# Patient Record
Sex: FEMALE | Race: OTHER | Employment: UNEMPLOYED | URBAN - METROPOLITAN AREA
[De-identification: names, ages, dates, MRNs, and addresses within clinical notes are randomized per-mention and may not be internally consistent; named-entity substitution may affect disease eponyms.]

---

## 2017-02-28 ENCOUNTER — CLINICAL SUPPORT (OUTPATIENT)
Dept: FAMILY MEDICINE CLINIC | Age: 32
End: 2017-02-28

## 2017-02-28 DIAGNOSIS — Z13.9 SCREENING: Primary | ICD-10-CM

## 2017-02-28 LAB
HCG URINE, QL. (POC): POSITIVE
VALID INTERNAL CONTROL?: YES

## 2017-02-28 NOTE — PROGRESS NOTES
Mary Wynn UPT in house today. Confirmation of Pregnancy document completed, signed and given to patient. [facility] provided and information therein reviewed regarding prenatal vitamins, WIC and options for prenatal care. Prenatal appointment scheduled for 4/4/2017 at [address]. Mil Zarate at [address] [facility], [address] [place] [address]. Location/contact information provided to patient. Explained Care Card/financial assistance process and emphasized need to inquire about this at outpatient registration. Advised that APA will also attempt to establish contact either in the hospital or via telephone and further emphasized the necessity of this additional financial screening. Reviewed reasons to go to ED. Communicated via , Gabrielle Benitez. Expressed understanding of above stated items and had no further questions. Stepan Albert RN

## 2017-04-04 ENCOUNTER — OFFICE VISIT (OUTPATIENT)
Dept: FAMILY MEDICINE CLINIC | Age: 32
End: 2017-04-04

## 2017-04-04 VITALS
DIASTOLIC BLOOD PRESSURE: 62 MMHG | HEART RATE: 78 BPM | HEIGHT: 59 IN | WEIGHT: 164 LBS | BODY MASS INDEX: 33.06 KG/M2 | OXYGEN SATURATION: 100 % | TEMPERATURE: 98 F | SYSTOLIC BLOOD PRESSURE: 109 MMHG | RESPIRATION RATE: 16 BRPM

## 2017-04-04 DIAGNOSIS — Z32.01 POSITIVE PREGNANCY TEST: Primary | ICD-10-CM

## 2017-04-04 LAB
HCG URINE, QL. (POC): POSITIVE
VALID INTERNAL CONTROL?: YES

## 2017-04-04 NOTE — PROGRESS NOTES
Subjective:   Enma Urrutia is a 28 y.o. female,  who is being seen today for possible pregnancy due to missed menses. LMP unknown. She is sexually active with partner and is not using contraception. Allergies- reviewed:   No Known Allergies      Medications- reviewed:   No current outpatient prescriptions on file. No current facility-administered medications for this visit. Past Medical History- reviewed:  History reviewed. No pertinent past medical history. Past Surgical History- reviewed:   No past surgical history on file. Social History- reviewed:  Social History     Social History    Marital status:      Spouse name: N/A    Number of children: N/A    Years of education: N/A     Occupational History    Not on file.      Social History Main Topics    Smoking status: Never Smoker    Smokeless tobacco: Not on file    Alcohol use No    Drug use: Not on file    Sexual activity: Not on file     Other Topics Concern    Not on file     Social History Narrative    No narrative on file         ROS:  General: No fevers or chills  GI: No abdominal pain, nausea, vomiting  : No vaginal bleeding      Objective:     Visit Vitals    /62    Pulse 78    Temp 98 °F (36.7 °C) (Oral)    Resp 16    Ht 4' 11.06\" (1.5 m)    Wt 164 lb (74.4 kg)    SpO2 100%    BMI 33.06 kg/m2       Physical Exam:  GENERAL APPEARANCE: alert, well appearing, in no apparent distress  HEAD: normocephalic, atraumatic  LUNGS: clear to auscultation, no wheezes, rales or rhonchi, symmetric air entry  HEART: regular rate and rhythm, no murmurs  ABDOMEN: FHT unable to appreciate  BACK: no CVA tenderness    Labs:  Urine pregnancy test positive    Recent Results (from the past 12 hour(s))   AMB POC URINE PREGNANCY TEST, VISUAL COLOR COMPARISON    Collection Time: 17  8:52 AM   Result Value Ref Range    VALID INTERNAL CONTROL POC Yes     HCG urine, Ql. (POC) Positive Negative         Assessment:       ICD-10-CM ICD-9-CM    1. Positive pregnancy test Z32.01 V72.42 AMB POC URINE PREGNANCY TEST, VISUAL COLOR COMPARISON       Pregnancy at unknown as LPM unknown. GALA: unknown. Plan:   · Return to clinic in 4 week(s) for initial prenatal visit  · Patient scheduled for dating ultrasound  · Prenatal vitamins      Orders Placed This Encounter    AMB POC URINE PREGNANCY TEST, VISUAL COLOR COMPARISON         I have discussed the diagnosis with the patient and the intended plan as seen in the above orders. The patient has received an after-visit summary and questions were answered concerning future plans. I have discussed medication side effects and warnings with the patient as well. Informed pt to return to the office or go to the ER if she experiences vaginal bleeding, vaginal discharge, leaking of fluid, pelvic cramping.       Alexander Berry MD  Family Medicine Resident

## 2017-04-04 NOTE — MR AVS SNAPSHOT
Visit Information Cam Olson Personal Médico Departamento Teléfono del [facility]. Número de visita 4/4/2017  8:45 AM Leigha Russell MD [facility] [phone] [id number] Follow-up Instructions Return in about 4 weeks (around 5/2/2017). Upcoming Health Maintenance Date Due DTaP/Tdap/Td series (1 - Tdap) 11/20/2007 PAP AKA CERVICAL CYTOLOGY 11/20/2007 INFLUENZA AGE 9 TO ADULT 8/1/2016 Alergias  Review Complete El: 4/4/2017 Por: Abdi Mcmullen LPN A partir del:  4/4/2017 No Known Allergies Vacunas actuales Marci Christians No hay ninguna vacuna archivada. No revisadas esta visita You Were Diagnosed With   
  
 Sofi Croft Positive pregnancy test    -  Primary ICD-10-CM: Z32.01 
ICD-9-CM: V72.42 Partes vitales PS Pulso Temperatura Resp Bentley ( percentil de crecimiento) Peso (percentil de crecimiento) 109/62 78 98 °F (36.7 °C) (Oral) 16 4' 11.06\" (1.5 m) 164 lb (74.4 kg) SpO2 BMI ([facility]) Estado obstétrico Estatus de tabaquísmo 100% 33.06 kg/m2 Unknown Never Smoker Historial de signos vitales BMI and BSA Data Body Mass Index Body Surface Area 33.06 kg/m 2 1.76 m 2 Charles lista de medicamentos actualizada Aviso  As of 4/4/2017  9:02 AM  
 No se le ha recetado ningún medicamento. Hicimos lo siguiente AMB POC URINE PREGNANCY TEST, VISUAL COLOR COMPARISON [65715 CPT(R)] Instrucciones de seguimiento Return in about 4 weeks (around 5/2/2017). Instrucciones para el Paciente Semanas 6 a 10 de charles embarazo: Instrucciones de cuidado - [ Eugune Jesenia 6 to 10 of Your Pregnancy: Care Instructions ] Instrucciones de cuidado Felicitaciones por charles embarazo. Carmen momento es muy emocionante e importante para usted.  
Joel primeras 6 a 10 semanas de [place], [place] cuerpo [facility] por muchos cambios. Lan bebé crece muy rápido, a pesar de que usted no pueda sentirlo aún. Safiasa Yu a notar que se siente distinta, tanto física tod emocionalmente. Dado que el embarazo de cada david es [place], no existe harsh manera correcta de sentirse. Podría sentirse más saludable que nunca o podría sentirse cansada o tener molestias estomacales (\"náuseas matutinas\"). Estas primeras semanas son un tiempo para bud decisiones saludables y comer los mejores alimentos para usted y lan bebé. Esta hoja de cuidados le dará [employer]. Carmen es también un buen momento para pensar acerca de las pruebas para detectar defectos congénitos. Estas son las pruebas que se hacen zay el embarazo para buscar posibles problemas con el bebé. Las pruebas del primer trimestre para detectar defectos de nacimiento se pueden hacer entre las semanas 10 y 15 del embarazo, dependiendo de la prueba. Hable con lan médico acerca de qué tipos de pruebas existen. La atención de seguimiento es harsh parte clave de lan tratamiento y seguridad. Asegúrese de hacer y acudir a todas las citas, y llame a lan médico si está teniendo problemas. También es harsh buena idea saber los resultados de mike exámenes y mantener harsh lista de los medicamentos que robin. Cómo puede cuidarse en el hogar? Maeola Pore · Coma al menos 3 comidas y 2 refrigerios saludables todos los días. Coma alimentos frescos y enteros, incluyendo: ¨ 7 o más porciones de pan, tortillas, cereales, arroz, pastas o alex. ¨ 3 o más porciones de verduras, en especial las de hojas verdes. ¨ 2 o más porciones de frutas. ¨ 3 o más porciones de [place], [place] o [place]-barre. ¨ 2 o más porciones de carne, [place], trice, pescado, huevos o frijoles (habichuelas) secos. · Joyce abundantes líquidos, sobre todo agua. Evite beber sodas y otras bebidas endulzadas.  
· Elija alimentos que contengan vitaminas que son importantes para lan bebé, tod calcio, dago y ácido fólico. 
 ¨ Los lácteos, el tofu, el pescado enlatado con espinas, las [place], el brócoli, las verduras de hojas verdes, las tortillas de maíz y el jugo de naranja fortificado son buenas stone de calcio. ¨ La carne, las aves, el hígado, la [place], las [place], los frijoles secos, los cereales fortificados y las frutas secas son ricos en dago. ¨ Las verduras de hojas oscuras, el brócoli, los [place], el hígado, los cereales fortificados, el jugo de [place], los cacahuates [place]) y las almendras son buenas stone de ácido fólico. 
· Evite comer alimentos que podrían hacerle daño al bebé. ¨ No coma carne, trice o pescado crudos o semicocidos (tod sushi u ostras crudas). ¨ No coma huevos crudos ni alimentos que contengan huevos crudos, tod el aderezo para [place]. ¨ No coma quesos blandos ni lácteos sin pasteurizar, tod queso \"brie\", feta o queso carissa. ¨ No consuma pescados que [place] de angelica, tod tiburón, pez debra, blanquillo o caballa gigante. No coma más de 6 onzas (170 g) de atún por semana. ¨ No coma brotes crudos, especialmente de alfalfa. ¨ Reduzca las bebidas que contengan cafeína, tod el café, el té y las torres. Protéjase y proteja a lan bebé · No toque las kassandra de excrementos del [place]. Pueden causarle harsh infección que podría hacerle daño al bebé. · La temperatura corporal kyle puede ser perjudicial para lan bebé. Así es que si Hermon Lingo usar un sauna o harsh bañera de hidromasaje, asegúrese de hablar con lan médico acerca de cómo usarlos con seguridad. Cómo lidiar con las náuseas matutinas · Joyce pequeños sorbos de [place], jugos o batidos. Pruebe a beber [employer], no con las comidas. · Coma 5 o 6 comidas pequeñas al día. Pruebe con pan zenia seco o galletas apenas se levante, y desayune un poco más tarde. · Evite los alimentos picantes, aceitosos o grasosos.  
· Cuando sienta malestar, dominguez las ventanas o salga a caminar para bud aire fresco. 
 · [facility]. Suelen ayudar a [facility] [employer]. · Informe a lan médico si usted piensa que las vitaminas prenatales le están haciendo mal. 

Dónde puede encontrar más información en inglés? Jose A Peoples a [email or web address] Escriba G112 en la búsqueda para aprender más acerca de \"Semanas 6 a 10 de lan embarazo: Instrucciones de cuidado - [ Mennie Oneal 6 to 10 of Your Pregnancy: Care Instructions ]. \" 
Revisado: 30 philippe, 2016 Versión del contenido: 11.2 © 7540-3362 Healthwise, Incorporated. Las instrucciones de cuidado fueron adaptadas bajo licencia por [employer] (which disclaims liability or warranty for this information). Si usted tiene [place] afección médica o sobre estas instrucciones, siempre pregunte a lan profesional de emiliano. Healthwise, Incorporated niega toda garantía o responsabilidad por lan uso de esta información. Introducing [facility]! [facility] introduce portal paciente [employer] [place] . Ahora se puede acceder a partes de lan expediente médico, enviar por correo electrónico la oficina de lan médico y solicitar renovaciones de medicamentos en línea. En lan navegador de Internet , Janeth Wayne a [email or web address] Jose clic en el usuario por Dawson Soulier? Tay Lev clic aquí en la sesión Murphy Jim. Verá la página de registro [place]. Ingrese lan código de [employer] Jennifer tram y tod aparece a continuación. Usted no tendrá que [employer] código después de clive completado el proceso de registro . Si usted no se inscribe antes de la fecha de caducidad , debe solicitar un nuevo código. · MyChart Código de acceso : [id number] Expires: 7/3/2017  8:42 AM 
 
Ingresa los últimos cuatro dígitos de lan Número de Seguro Social ( xxxx ) y fecha de nacimiento ( dd / mm / aaaa ) tod se indica y jose clic en Enviar. Usted será llevado a la siguiente página de registro . Crear un ID MyChart . Esta será lan ID de inicio de sesión de MyChart y no puede ser [place] , por lo que pensar en harsh que es Lillia Peeks y fácil de recordar . Crear harsh contraseña MyChart . Usted puede cambiar lan contraseña en cualquier momento . Ingrese lan Password Reset de preguntas y [place] . [place] se puede utilizar en un momento posterior si usted olvida lan contraseña. Introduzca lan dirección de correo electrónico . Dorenda Opmarisol recibirá harsh notificación por correo electrónico cuando la nueva información está disponible en MyChart . Thresea Kenny clic en Registrarse. Bristolarlin Mcdonald jodi y descargar porciones de lan expediente médico. 
Thu clic en el enlace de descarga del menú Resumen para descargar harsh copia portátil de lan información médica . Si tiene [employer] , por favor visite la sección de preguntas frecuentes del sitio web MyChart . Recuerde, MyChart NO es que se utilizará para las necesidades urgentes. Para emergencias médicas , llame al 911 . Ahora disponible en lan iPhone y Android ! Por favor proporcione blue resumen de la documentación de cuidado a lan próximo proveedor. If you have any questions after today's visit, please call [phone].

## 2017-04-04 NOTE — PATIENT INSTRUCTIONS
Semanas 6 a 10 de lan embarazo: Instrucciones de cuidado - [ Sallie Melena 6 to 10 of Your Pregnancy: Care Instructions ]  Instrucciones de cuidado    Felicitaciones por lan embarazo. Carmen momento es muy emocionante e importante para usted. [place] primeras 6 a 10 semanas de [place], lan cuerpo [place] por muchos cambios. Lan bebé crece muy rápido, a pesar de que usted no pueda sentirlo aún. Valeriy Lux a notar que se siente distinta, tanto física tod emocionalmente. Dado que el embarazo de cada david es [place], no existe harsh manera correcta de sentirse. Podría sentirse más saludable que nunca o podría sentirse cansada o tener molestias estomacales (\"náuseas matutinas\"). Estas primeras semanas son un tiempo para bud decisiones saludables y comer los mejores alimentos para usted y lan bebé. Esta hoja de cuidados le dará [employer]. Carmen es también un buen momento para pensar acerca de las pruebas para detectar defectos congénitos. Estas son las pruebas que se hacen zay el embarazo para buscar posibles problemas con el bebé. Las pruebas del primer trimestre para detectar defectos de nacimiento se pueden hacer entre las semanas 10 y 15 del embarazo, dependiendo de la prueba. Hable con lan médico acerca de qué tipos de pruebas existen. La atención de seguimiento es harsh parte clave de lan tratamiento y seguridad. Asegúrese de hacer y acudir a todas las citas, y llame a lan médico si está teniendo problemas. También es harsh buena idea saber los resultados de mike exámenes y mantener harsh lista de los medicamentos que robin. ¿Cómo puede cuidarse en el hogar? Aliméntese john  · Coma al menos 3 comidas y 2 refrigerios saludables todos los días. Coma alimentos frescos y enteros, incluyendo:  ¨ 7 o más porciones de pan, tortillas, cereales, arroz, pastas o alex. ¨ 3 o más porciones de verduras, en especial las de hojas verdes. ¨ 2 o más porciones de frutas. ¨ 3 o más porciones de [place], [place] o [place]-barre.   ¨ 2 o más porciones de carne, pavo, trice, pescado, huevos o frijoles (habichuelas) secos. · Joyce abundantes líquidos, sobre todo agua. Evite beber sodas y otras bebidas endulzadas. · Elija alimentos que contengan vitaminas que son importantes para lan bebé, tod calcio, dago y ácido fólico.  ¨ Los lácteos, el tofu, el pescado enlatado con espinas, las [place], el brócoli, las verduras de hojas verdes, las tortillas de maíz y el jugo de naranja fortificado son buenas stone de calcio. ¨ La carne, las aves, el hígado, la [place], las [place], los frijoles secos, los cereales fortificados y las frutas secas son ricos en dago. ¨ Las verduras de hojas oscuras, el brócoli, los [place], el hígado, los cereales fortificados, el jugo de [place], los cacahuates [place]) y las almendras son buenas stone de ácido fólico.  · Evite comer alimentos que podrían hacerle daño al bebé. ¨ No coma carne, trice o pescado crudos o semicocidos (tod sushi u ostras crudas). ¨ No coma huevos crudos ni alimentos que contengan huevos crudos, tod el aderezo para [place]. ¨ No coma quesos blandos ni lácteos sin pasteurizar, tod queso \"brie\", feta o queso carissa. ¨ No consuma pescados que [place] de angelica, tod tiburón, pez debra, blanquillo o caballa gigante. No coma más de 6 onzas (170 g) de atún por semana. ¨ No coma brotes crudos, especialmente de alfalfa. ¨ Reduzca las bebidas que contengan cafeína, tod el café, el té y las torres. Protéjase y proteja a lan bebé  · No toque las kassandra de excrementos del [place]. Pueden causarle harsh infección que podría hacerle daño al bebé. · La temperatura corporal kyle puede ser perjudicial para lan bebé. Así es que si N[place] Lot usar un sauna o harsh bañera de hidromasaje, asegúrese de hablar con lan médico acerca de cómo usarlos con seguridad. Cómo lidiar con las náuseas matutinas  · [place] pequeños sorbos de [facility], [facility] haven o batidos.  Pruebe a beber [employer], no con las comidas. · Coma 5 o 6 comidas pequeñas al día. Pruebe con pan zenia seco o galletas apenas se levante, y desayune un poco más tarde. · Evite los alimentos picantes, aceitosos o grasosos. · Cuando sienta malestar, dominguez las ventanas o salga a caminar para bud aire fresco.  · Pruebe las pulseras antináuseas. Suelen ayudar a [employer]. · Informe a lan médico si usted piensa que las vitaminas prenatales le están haciendo mal.  ¿Dónde puede encontrar más información en inglés? Nilesh [place] a [email or web address]/. Escriba G112 en la búsqueda para aprender más acerca de \"Semanas 6 a 10 de lan embarazo: Instrucciones de cuidado - [ Theodore Lugo 6 to 10 of Your Pregnancy: Care Instructions ]. \"  Revisado: 30 [place], 2016  Versión del contenido: 11.2  © 5901-0955 Healthwise, Incorporated. Las instrucciones de cuidado fueron adaptadas bajo licencia por [employer] Help Connections (which disclaims liability or warranty for this information). Si usted tiene [place] afección médica o sobre estas instrucciones, siempre pregunte a lan profesional de emiliano. Healthwise, Incorporated niega toda garantía o responsabilidad por lan uso de esta información.

## 2017-04-18 ENCOUNTER — HOSPITAL ENCOUNTER (OUTPATIENT)
Dept: LAB | Age: 32
Discharge: HOME | End: 2017-04-18
Payer: SUBSIDIZED

## 2017-04-18 ENCOUNTER — INITIAL PRENATAL (OUTPATIENT)
Dept: FAMILY MEDICINE CLINIC | Age: 32
End: 2017-04-18

## 2017-04-18 VITALS
OXYGEN SATURATION: 98 % | DIASTOLIC BLOOD PRESSURE: 61 MMHG | SYSTOLIC BLOOD PRESSURE: 104 MMHG | WEIGHT: 164.2 LBS | RESPIRATION RATE: 16 BRPM | HEIGHT: 59 IN | BODY MASS INDEX: 33.1 KG/M2 | TEMPERATURE: 99.2 F | HEART RATE: 78 BPM

## 2017-04-18 DIAGNOSIS — Z34.91 FIRST TRIMESTER PREGNANCY: Primary | ICD-10-CM

## 2017-04-18 DIAGNOSIS — N39.0 URINARY TRACT INFECTION WITHOUT HEMATURIA, SITE UNSPECIFIED: ICD-10-CM

## 2017-04-18 LAB
BILIRUB UR QL STRIP: NEGATIVE
GLUCOSE UR-MCNC: NEGATIVE MG/DL
KETONES P FAST UR STRIP-MCNC: NEGATIVE MG/DL
PH UR STRIP: 8.5 [PH] (ref 4.6–8)
PROT UR QL STRIP: ABNORMAL MG/DL
SP GR UR STRIP: 1.01 (ref 1–1.03)
UA UROBILINOGEN AMB POC: ABNORMAL (ref 0.2–1)
URINALYSIS CLARITY POC: CLEAR
URINALYSIS COLOR POC: YELLOW
URINE BLOOD POC: NEGATIVE
URINE LEUKOCYTES POC: ABNORMAL
URINE NITRITES POC: NEGATIVE

## 2017-04-18 PROCEDURE — 88175 CYTOPATH C/V AUTO FLUID REDO: CPT | Performed by: FAMILY MEDICINE

## 2017-04-18 PROCEDURE — 87624 HPV HI-RISK TYP POOLED RSLT: CPT | Performed by: FAMILY MEDICINE

## 2017-04-18 NOTE — PATIENT INSTRUCTIONS
Andrew Ni a lan médico zay el embarazo (hasta las 20 semanas) - [ Learning About When to Call Your Doctor During Pregnancy (Up to 20 Weeks) ]  Instrucciones de cuidado  Es normal que tenga inquietudes acerca de lo que podría ser un problema zay el [facility]. Aunque la mayoría de las mujeres embarazadas no tienen ningún problema grave, es importante saber cuándo llamar a lan médico si tiene determinados síntomas. Estas son algunas sugerencias generales. Lan médico puede darle más información sobre cuándo llamar. Cuándo llamar a lan médico (hasta las 20 semanas)  Llame al 911 en cualquier momento que sospeche que puede necesitar atención de [place]. Por ejemplo, llame si:  · Se desmayó (perdió el conocimiento). Llame a lan médico ahora mismo o busque atención médica inmediata si:  · Tiene fiebre. · Tiene sangrado vaginal.  · Está mareada o aturdida, o siente que puede desmayarse. · Tiene síntomas de harsh infección del tracto urinario. Estos pueden incluir:  ¨ Dolor o ardor al orinar. ¨ Necesidad de orinar con frecuencia sin poder eliminar mucha orina. ¨ Dolor en el flanco, que se encuentra lebron debajo de la caja torácica y [place] de la cintura en un lado de la espalda. ¨ Maxime en la orina. · Tiene dolor abdominal.  · Paola que está teniendo contracciones. · Tiene harsh pérdida repentina de líquido por la vagina. Preste especial atención a los cambios en lan emiliano y asegúrese de comunicarse con lan médico si:  · Tiene flujo vaginal con un olor desagradable. · Tiene otras inquietudes acerca de lan embarazo. La atención de seguimiento es harsh parte clave de lan tratamiento y seguridad. Asegúrese de hacer y acudir a todas las citas, y llame a lan médico si está teniendo problemas. También es harsh buena idea saber los resultados de mike exámenes y mantener harsh lista de los medicamentos que robin. ¿Dónde puede encontrar más información en inglés?   Jose A [place] a [email or web address]/. Jesse More W410 en la búsqueda para aprender más acerca de \"Aprenda cuándo llamar a lan médico zay el embarazo ([address] 20 semanas) - [ Learning About When to Call Your Doctor During Pregnancy (Up to 20 Weeks) ]. \"  Revisado: 30 philippe, 2016  Versión del contenido: 11.2  © 8684-9049 Healthwise, Incorporated. Las instrucciones de cuidado fueron adaptadas bajo licencia por [employer] Connections (which disclaims liability or warranty for this information). Si usted tiene Cornelius [place] afección médica o sobre estas instrucciones, siempre pregunte a lan profesional de emiliano. Healthwise, Incorporated niega toda garantía o responsabilidad por lan uso de esta información.

## 2017-04-18 NOTE — MR AVS SNAPSHOT
Visit Information Meg Melgar Personal Médico Departamento Teléfono del Dep. Número de visita 4/18/2017  9:50 AM Leigha Albert, 1000 [facility] [phone] Follow-up Instructions Return in about 4 weeks (around 5/16/2017). Your Appointments 4/25/2017  1:00 PM  
PROCEDURE with Corey Veronica MD  
1000 [facility] 3651 [facility]) Appt Note: dating us Juli Madre Arcelia Lynne Dougherty 906 1007 [facility]  
[phone]  
  
   
 Juli Madre Arcelia Lynne Dougherty 906 [address] [address] Upcoming Health Maintenance Date Due DTaP/Tdap/Td series (1 - Tdap) 11/20/2007 PAP AKA CERVICAL CYTOLOGY 11/20/2007 INFLUENZA AGE 9 TO ADULT 8/1/2016 Alergias  Review Complete El: 4/18/2017 Por: Rashi Arteaga LPN A partir del:  4/18/2017 No Known Allergies Vacunas actuales Miko Radar No hay ninguna vacuna archivada. No revisadas esta visita You Were Diagnosed With   
  
 [place] First trimester pregnancy    -  Primary ICD-10-CM: Z33.1 ICD-9-CM: V22.2 Partes vitales PS Pulso Temperatura Resp [place] ( percentil de crecimiento) Peso (percentil de crecimiento) 104/61 (BP 1 Location: Left arm, BP Patient Position: Sitting) 78 99.2 °F (37.3 °C) (Oral) 16 4' 11.06\" (1.5 m) 164 lb 3.2 oz (74.5 kg) SpO2 BMI (IMC) Estado obstétrico Estatus de tabaquísmo 98% 33.1 kg/m2 Pregnant Never Smoker Historial de signos vitales BMI and BSA Data Body Mass Index Body Surface Area  
 33.1 kg/m 2 1.76 m 2 [facility] Name Phone [facility] 0651, [address] [facility] [address] [place] [address] Charles lista de medicamentos actualizada Lista actualizada el: 4/18/17 10:17 AM.  Agueda Kurk use charles lista de medicamentos más reciente. PRENATAL VITAMIN PO Take  by mouth. Hicimos lo siguiente ABO GROUPING AND RHO(D) TYPING [id number] CPT(R)] AMB POC URINALYSIS DIP STICK AUTO W/O MICRO [37991 CPT(R)] ANTIBODY SCREEN [id number] CPT(R)] CBC W/O DIFF [10792 CPT(R)] Metro Du / Randeen Askew [id number] CPT(R)] CULTURE, URINE [id number] CPT(R)] HIV 1/2 AG/AB, 4TH GENERATION,W RFLX CONFIRM [[id number] Custom] PAP IG, APTIMA HPV AND RFX 16/18,45 (892552) [[id number] Custom] RUBELLA AB, IGG [id number] CPT(R)] T PALLIDUM AB [[id number] Custom] Instrucciones de seguimiento Return in about 4 weeks (around 5/16/2017). Por hacer 04/18/2017 Lab:  HEP B SURFACE AG   
  
 04/18/2017 Lab:  VZV AB, IGG Instrucciones para el Paciente Aliyah Brett a lan médico zay el embarazo ([address] [place] [address] 20 semanas) - [ Learning About When to Call Your Doctor During Pregnancy (Up to 20 Weeks) ] Instrucciones de cuidado Es normal que tenga inquietudes acerca de lo que podría ser un problema zay el [facility]. Aunque la mayoría de las mujeres embarazadas no tienen ningún problema grave, es importante saber cuándo llamar a lan médico si tiene determinados síntomas. Estas son algunas sugerencias generales. Lan médico puede darle más información sobre cuándo llamar. Cuándo llamar a lan médico ([address]) Llame al 911 en cualquier momento que sospeche que puede necesitar atención de [place]. Por ejemplo, llame si: 
· Se desmayó (perdió el conocimiento). Llame a lan médico ahora mismo o busque atención médica inmediata si: · Tiene fiebre. · Tiene sangrado vaginal. 
· Está mareada o aturdida, o siente que puede desmayarse. · Tiene síntomas de harsh infección del tracto urinario. Estos pueden incluir: ¨ Dolor o ardor al orinar. ¨ Necesidad de orinar con frecuencia sin poder eliminar mucha orina. ¨ Dolor en el flanco, que se encuentra lebron debajo de la caja torácica y [place] de la cintura en un lado de la espalda. ¨ Maxime en la orina. · Tiene dolor abdominal. 
· Paola que está teniendo contracciones. · Tiene harsh pérdida repentina de líquido por la vagina. Preste especial atención a los cambios en lan emiliano y asegúrese de comunicarse con lan médico si: · Tiene flujo vaginal con un olor desagradable. · Tiene otras inquietudes acerca de lan embarazo. La atención de seguimiento es harsh parte clave de lan tratamiento y seguridad. Asegúrese de hacer y acudir a todas las citas, y llame a lan médico si está teniendo problemas. También es harsh buena idea saber los resultados de mike exámenes y mantener harsh lista de los medicamentos que robin. Dónde puede encontrar más información en inglés? Norma Deter a [email or web address]://brittani-winston.info/. Abhay Puff H922 en la búsqueda para aprender más acerca de \"Aprenda cuándo llamar a lan médico zay el embarazo ([address] [facility] 20 semanas) - [ Learning About When to Call Your Doctor During Pregnancy (Up to 20 Weeks) ]. \" 
Revisado: 30 philippe, 2016 Versión del contenido: 11.2 © 9201-1711 Healthwise, Incorporated. Las instrucciones de cuidado fueron adaptadas bajo licencia por Good Help Connections (which disclaims liability or warranty for this information). Si usted tiene [place] afección médica o sobre estas instrucciones, siempre pregunte a lan profesional de emiliano. Healthwise, Incorporated niega toda garantía o responsabilidad por lan uso de esta información. Introducing [facility]! [facility] introduce portal paciente MyChart . Ahora se puede acceder a partes de lan expediente médico, enviar por correo electrónico la oficina de lan médico y solicitar renovaciones de medicamentos en línea. En lan navegador de Internet , Elinda Stack a https://[email or web address]. [email or web address]. [email or web address]/[email or web address] Thu clic en el usuario por Elonda Atilio? Graciella Rang clic aquí en la sesión Diana Saenz. Verá la página de registro [place]. Ingrese lan código de [facility] tram y tod aparece a continuación. Usted no tendrá que [employer] código después de clive completado el proceso de registro . Si usted no se inscribe antes de la fecha de caducidad , debe solicitar un nuevo código. · MyChart Código de acceso : [id number] Expires: 7/3/2017  8:42 AM 
 
Ingresa los últimos cuatro dígitos de lan Número de Seguro Social ( xxxx ) y fecha de nacimiento ( dd / mm / aaaa ) tod se indica y thu clic en Enviar. Usted será llevado a la siguiente página de registro . Crear un ID MyChart . Esta será lan ID de inicio de sesión de MyChart y no puede ser [place] , por lo que pensar en harsh que es [place] Fruit y fácil de recordar . Crear harsh contraseña MyChart . Usted puede cambiar lan contraseña en cualquier momento . Ingrese lan Password Reset de preguntas y [place] . [place] se puede utilizar en un momento posterior si usted olvida lan contraseña. Introduzca lan dirección de correo electrónico . Evelyn Oddi recibirá harsh notificación por correo electrónico cuando la nueva información está disponible en MyChart . Yariel Ortiz clic en Registrarse. Stevie Bueno jodi y descargar porciones de lan expediente médico. 
Thu clic en el enlace de descarga del menú Resumen para descargar harsh copia portátil de lan información médica . Si tiene [employer] , por favor visite la sección de preguntas frecuentes del sitio web MyChart . Recuerde, MyChart NO es que se utilizará para las necesidades urgentes. Para emergencias médicas , llame al 911 . Ahora disponible en lan iPhone y Android ! Por favor proporcione blue resumen de la documentación de cuidado a lan próximo proveedor. If you have any questions after today's visit, please call [phone].

## 2017-04-18 NOTE — PROGRESS NOTES
Vidimax  # [id number]    Ruby Dolan is a 28 y.o. female  Chief Complaint   Patient presents with    Initial Prenatal Visit     1. Have you been to the ER, urgent care clinic since your last visit? Hospitalized since your last visit? No    2. Have you seen or consulted any other health care providers outside of the [address] [place] [address] since your last visit? Include any pap smears or colon screening.  No

## 2017-04-18 NOTE — PROGRESS NOTES
Subjective:   Anisha Robertson is a 28 y.o. female  who is being seen today for her first obstetrical visit. Patient's last menstrual period is unknown because her periods are very irregular. She is at unknown gestation. Her obstetrical history is significant for Pre-eclampsia. Pregnancy history fully reviewed. History reviewed. No pertinent past medical history. Current Outpatient Prescriptions   Medication Sig Dispense Refill    PRENATAL VIT CALC,IRON,FOLIC (PRENATAL VITAMIN PO) Take  by mouth. No Known Allergies  Family History   Problem Relation Age of Onset    GERD Mother     Parkinsonism Father      Social History     Social History    Marital status:      Spouse name: N/A    Number of children: N/A    Years of education: N/A     Occupational History    Not on file. Social History Main Topics    Smoking status: Never Smoker    Smokeless tobacco: Not on file    Alcohol use No    Drug use: Not on file    Sexual activity: Not on file     Other Topics Concern    Not on file     Social History Narrative       Objective:     Visit Vitals     4' 11.06\" (1.5 m)    Wt 164 lb 3.2 oz (74.5 kg)    BMI 33.1 kg/m2       Physical Exam:  GENERAL APPEARANCE: alert, well appearing, in no apparent distress  HEAD: normocephalic, atraumatic  BREASTS: no skin changes  LUNGS: clear to auscultation, no wheezes, rales or rhonchi, symmetric air entry  HEART: regular rate and rhythm, no murmurs  ABDOMEN: FHT present 135  BACK: no CVA tenderness  EXTERNAL GENITALIA: normal appearing vulva with no masses, tenderness or lesions  VAGINA: thick, yellowish discharge  CERVIX: no lesions   UTERUS: gravid  EXTREMITIES: no redness or tenderness in the calves or thighs, no edema  NEUROLOGICAL: alert, oriented, normal speech, no focal findings or movement disorder noted  SKIN: normal coloration and turgor, no rashes    Assessment:     Pregnancy at unknown GA.  Pregnancy complicated by history of Pre-E in previous pregnancy. Plan:   -Initial labs/specimens collected (CBC, blood type & screen, Rh antibody screen, rubella titer, varicella titer, HBsAg titer, RPR, HIV, UA w/ cx, pap smear, gonorrhea/chlamydia cx.  -Prenatal vitamins.  -Problem list reviewed and updated. - AFP3 (Quad screen) discussed: will try to obtain once dates are established. - Anatomy scan at 20 weeks. Form faxed will be faxed to [facility]. - [address] given to patient. - Will provide prenatal passport at next visit, as patient did not receive it today. - Patient will return genetic screening form at next visit. - Dating ultrasound on 4/25  -Follow-up in 4 weeks. Appointment on 5/16. I have discussed the diagnosis with the patient and the intended plan as seen in the above orders. The patient has received an after-visit summary and questions were answered concerning future plans. I have discussed medication side effects and warnings with the patient as well. Patient discussed with Dr. Cheryl Eason.     Raf Gupta MD  Family Medicine Resident

## 2017-04-20 LAB
ABO GROUP BLD DONR: NORMAL
ANTIBODY SCREEN, EXTERNAL: NEGATIVE
BACTERIA UR CULT: ABNORMAL
BLD GP AB SCN SERPL QL: NEGATIVE
C TRACH RRNA SPEC QL NAA+PROBE: NEGATIVE
CHLAMYDIA, EXTERNAL: NEGATIVE
ERYTHROCYTE [DISTWIDTH] IN BLOOD BY AUTOMATED COUNT: 13.6 % (ref 12.3–15.4)
HBSAG, EXTERNAL: NEGATIVE
HBV SURFACE AG SERPL QL IA: NEGATIVE
HCT VFR BLD AUTO: 37.8 % (ref 34–46.6)
HGB BLD-MCNC: 12.8 G/DL (ref 11.1–15.9)
HIV 1+2 AB+HIV1 P24 AG SERPL QL IA: NON REACTIVE
HIV, EXTERNAL: NEGATIVE
MCH RBC QN AUTO: 30.3 PG (ref 26.6–33)
MCHC RBC AUTO-ENTMCNC: 33.9 G/DL (ref 31.5–35.7)
MCV RBC AUTO: 89 FL (ref 79–97)
N GONORRHOEA RRNA SPEC QL NAA+PROBE: NEGATIVE
N. GONORRHEA, EXTERNAL: NEGATIVE
PLATELET # BLD AUTO: 269 X10E3/UL (ref 150–379)
RBC # BLD AUTO: 4.23 X10E6/UL (ref 3.77–5.28)
RH BLD: POSITIVE
RPR, EXTERNAL: NON REACTIVE
RUBELLA, EXTERNAL: NORMAL
RUBV IGG SERPL IA-ACNC: 5.04 INDEX
T PALLIDUM AB SER QL IA: NEGATIVE
TYPE, ABO & RH, EXTERNAL: NORMAL
VZV IGG SER IA-ACNC: 521 INDEX
WBC # BLD AUTO: 10 X10E3/UL (ref 3.4–10.8)

## 2017-04-21 ENCOUNTER — TELEPHONE (OUTPATIENT)
Dept: FAMILY MEDICINE CLINIC | Age: 32
End: 2017-04-21

## 2017-04-21 RX ORDER — CEPHALEXIN 500 MG/1
500 CAPSULE ORAL 2 TIMES DAILY
Qty: 14 CAP | Refills: 0 | Status: SHIPPED | OUTPATIENT
Start: 2017-04-21 | End: 2017-04-28

## 2017-04-24 ENCOUNTER — TELEPHONE (OUTPATIENT)
Dept: FAMILY MEDICINE CLINIC | Age: 32
End: 2017-04-24

## 2017-04-24 NOTE — TELEPHONE ENCOUNTER
----- Message from Waylon Caldwell sent at 4/24/2017 12:13 PM EDT -----  Regarding: Dr. Lawrence Bernardo Telephone  Patient needs to reschedule procedure appt for 04/25/17. Please call her back at [phone].   Patient speaks Setswana

## 2017-05-16 ENCOUNTER — ROUTINE PRENATAL (OUTPATIENT)
Dept: FAMILY MEDICINE CLINIC | Age: 32
End: 2017-05-16

## 2017-05-16 VITALS
HEART RATE: 76 BPM | TEMPERATURE: 97.8 F | OXYGEN SATURATION: 99 % | BODY MASS INDEX: 33.26 KG/M2 | DIASTOLIC BLOOD PRESSURE: 76 MMHG | RESPIRATION RATE: 18 BRPM | WEIGHT: 165 LBS | HEIGHT: 59 IN | SYSTOLIC BLOOD PRESSURE: 109 MMHG

## 2017-05-16 DIAGNOSIS — Z34.00 PRENATAL CARE, FIRST PREGNANCY, UNSPECIFIED TRIMESTER: Primary | ICD-10-CM

## 2017-05-16 LAB
BILIRUB UR QL STRIP: NEGATIVE
GLUCOSE UR-MCNC: NEGATIVE MG/DL
KETONES P FAST UR STRIP-MCNC: NEGATIVE MG/DL
PH UR STRIP: 7.5 [PH] (ref 4.6–8)
PROT UR QL STRIP: NEGATIVE MG/DL
SP GR UR STRIP: 1.01 (ref 1–1.03)
UA UROBILINOGEN AMB POC: NORMAL (ref 0.2–1)
URINALYSIS CLARITY POC: CLEAR
URINALYSIS COLOR POC: YELLOW
URINE BLOOD POC: NEGATIVE
URINE LEUKOCYTES POC: NORMAL
URINE NITRITES POC: NEGATIVE

## 2017-05-16 NOTE — PATIENT INSTRUCTIONS
Semanas 18 a 22 de lan embarazo: Instrucciones de cuidado - [ Dane Spotted 18 to 22 of Your Pregnancy: Care Instructions ]  Instrucciones de cuidado    Lan bebé continúa desarrollándose rápidamente. En esta etapa, los bebés ya pueden chuparse el pulgar, agarrar firmemente con las [place], y abrir y cerrar los párpados. En algún Altman's 18 y 25, comenzará a sentir que el bebé se [place]. Al principio, estos pequeños movimientos se sentirán tod un aleteo o un vuelo de mariposas. Algunas mujeres dicen que sienten tod burbujas de [place]. A medida que el bebé crece, estos movimientos serán más karen. También podría observar que lan bebé patea y tiene hipo. Zay blue Jon, podría descubrir que las náuseas y la fatiga desaparecieron. En general, es posible que se sienta mejor y tenga más energía que la que tenía zay el primer trimestre. Sin embargo, también podría tener nuevas ANDOVER, tod problemas para dormir o calambres en las piernas. Esta hoja de cuidados la ayudará a aliviar esas molestias. La atención de seguimiento es harsh parte clave de lan tratamiento y seguridad. Asegúrese de hacer y acudir a todas las citas, y llame a lan médico si está teniendo problemas. También es harsh buena idea saber los resultados de los exámenes y mantener harsh lista de los medicamentos que robin. ¿Cómo puede cuidarse en el hogar? Alivie los problemas para dormir  · Evite la cafeína en las bebidas o los chocolates a última hora del día. · Thu algo de ejercicio todos los días. · Dúchese o báñese en agua tibia antes de irse a la cama. · Coma un refrigerio liviano o mala un vaso de leche a la hora de dormir. · Thu ejercicios de relajación en la cama para tranquilizar lan mente y lan cuerpo. · Apoye mike piernas y lan espalda con almohadas adicionales. Si duerme de costado, pruebe a ponerse harsh [employer] mike piernas. · No use píldoras para dormir ni consuma alcohol. Podrían hacerle daño a lan bebé.   Braden & Ling calambres en las piernas  · No masajee lan pantorrilla mientras tiene un calambre. · Siéntese en harsh cama o silla firme. Estire la patsyrobbin y [place] ([facility] el [facility]) despacio, Arthur hernandez, en dirección a la rodilla. Doble los dedos de los pies hacia arriba y [place]. · Póngase de pie sobre harsh superficie plana y fresca. Estire los dedos de los pies hacia arriba y dé pequeños pasos con el talón. · Use harsh almohadilla térmica o harsh bolsa de [place] para aliviar josue musculares. Prevenga los calambres en las piernas  · Asegúrese de consumir suficiente calcio. Si está preocupada porque no está obteniendo lo suficiente, hable con lan médico.  · Thu ejercicio todos los adonis y estire las piernas antes de irse a dormir. · Dese un baño tibio antes de irse a dormir y pruebe a usar calentadores de piernas. ¿Dónde puede encontrar más información en inglés? Roshan Quick a http://brittani-winston.info/. Jilda Burkitt [id number] en la búsqueda para aprender más acerca de \"Semanas 18 a 22 de lan embarazo: Instrucciones de cuidado - [ Val [place] 18 to 22 of Your Pregnancy: Care Instructions ]. \"  Revisado: [address] [place], 2016  Versión del contenido: 11.2  © 0986-8035 Healthwise, Incorporated. Las instrucciones de cuidado fueron adaptadas bajo licencia por Good Help Connections (which disclaims liability or warranty for this information). Si usted tiene [place] afección médica o sobre estas instrucciones, siempre pregunte a lan profesional de emiliano. Healthwise, Incorporated niega toda garantía o responsabilidad por lan uso de esta información.

## 2017-05-16 NOTE — MR AVS SNAPSHOT
Visit Information Abimael Redding Personal Médico Departamento Teléfono del Dep. Número de visita 5/16/2017  3:30 PM Elizabeth Rajput [facility] [phone] Your Appointments 6/14/2017  9:25 AM  
OB VISIT with Caroline Flynn MD  
Elizabeth Ordoñez [facility]) Appt Note: OB  
 [address]  
[phone]  
  
   
 [address] [facility] 99 [address] Upcoming Health Maintenance Date Due DTaP/Tdap/Td series (1 - Tdap) 11/20/2007 INFLUENZA AGE 9 TO ADULT 8/1/2017 PAP AKA CERVICAL CYTOLOGY 4/18/2020 Alergias  Review Complete El: 5/16/2017 Por: Nini Thompson LPN A partir del:  5/16/2017 No Known Allergies Vacunas actuales Jennifer Rad No hay ninguna vacuna archivada. No revisadas esta visita You Were Diagnosed With   
  
 Radha Ashton Prenatal care, first pregnancy, unspecified trimester    -  Primary ICD-10-CM: Z34.00 ICD-9-CM: V22.0 Partes vitales PS Pulso Temperatura Resp [place] ( percentil de crecimiento) Peso (percentil de crecimiento) 109/76 76 97.8 °F (36.6 °C) (Oral) 18 4' 11\" (1.499 m) 165 lb (74.8 kg) LMP (última micah) SpO2 BMI ([facility]) Estado obstétrico Estatus de tabaquísmo 01/10/2017 99% 33.33 kg/m2 Pregnant Never Smoker Historial de signos vitales BMI and BSA Data Body Mass Index Body Surface Area  
 33.33 kg/m 2 1.76 m 2 [facility] Name Phone [facility], [address] [place] [address] Charles lista de medicamentos actualizada Lista actualizada el: 5/16/17  3:56 PM.  Albino Golconda use charles lista de medicamentos más reciente. PRENATAL VITAMIN PO Take  by mouth. Hicimos lo siguiente AFP TETRA SCREEN, OBSTETRICAL [id number] CPT(R)] AMB POC URINALYSIS DIP STICK AUTO W/O MICRO [04654 CPT(R)] Instrucciones para el Paciente Semanas 18 a 22 de lan embarazo: Instrucciones de cuidado - [ Dorothy Brad 18 to 22 of Your Pregnancy: Care Instructions ] Instrucciones de cuidado Lan bebé continúa desarrollándose rápidamente. En esta etapa, los bebés ya pueden chuparse el pulgar, agarrar firmemente con las [place], y abrir y cerrar los párpados. En algún Altman's 18 y 25, comenzará a sentir que el bebé se [place]. Al principio, estos pequeños movimientos se sentirán tod un aleteo o un vuelo de mariposas. Algunas mujeres dicen que sienten tod burbujas de [place]. A medida que el bebé crece, estos movimientos serán más karen. También podría observar que lan bebé patea y tiene hipo. Zay blue [place], podría descubrir que las náuseas y la fatiga desaparecieron. En general, es posible que se sienta mejor y tenga más energía que la que tenía zay el primer trimestre. Sin embargo, también podría tener nuevas ANDOVER, tod problemas para dormir o calambres en las piernas. Esta hoja de cuidados la ayudará a aliviar esas molestias. La atención de seguimiento es harsh parte clave de lan tratamiento y seguridad. Asegúrese de hacer y acudir a todas las citas, y llame a lan médico si está teniendo problemas. También es harsh buena idea saber los resultados de los exámenes y mantener harsh lista de los medicamentos que robin. Cómo puede cuidarse en el hogar? Alivie los problemas para dormir · Evite la cafeína en las bebidas o los chocolates a última hora del día. · Thu algo de ejercicio todos los días. · Dúchese o báñese en agua tibia antes de irse a la cama. · Coma un refrigerio liviano o mala un vaso de leche a la hora de dormir. · Thu ejercicios de relajación en la cama para tranquilizar lan mente y lan cuerpo. · Apoye mike piernas y lan espalda con almohadas adicionales.  Si duerme de costado, pruebe a ponerse harsh "[employer] mike piernas. · No use píldoras para dormir ni consuma alcohol. Podrían hacerle daño a lan bebé. Alivie los [employer] piernas · No masajee lan pantorrilla mientras tiene un calambre. · Siéntese en harsh cama o silla firme. Estire la [facility] Health[facility] y [place] ([facility]) despacio, Arthur hernandez, en dirección a la rodilla. Doble los dedos de los pies hacia arriba y [place]. · Póngase de pie sobre harsh superficie plana y fresca. Estire los dedos de los pies hacia arriba y dé pequeños pasos con el talón. · Use harsh almohadilla térmica o harsh bolsa de [place] para aliviar josue musculares. Prevenga los calambres en las piernas · Asegúrese de consumir suficiente calcio. Si está preocupada porque no está obteniendo lo suficiente, hable con lan médico. 
· Thu ejercicio todos los adonis y estire las piernas antes de irse a dormir. · Dese un baño tibio antes de irse a dormir y pruebe a usar calentadores de piernas. Dónde puede encontrar más información en inglés? Jacquie Dickinson a http://brittani-winston.info/. Maria Teresa Kirkpatrick J647 en la búsqueda para aprender más acerca de \"Semanas 18 a 22 de lan embarazo: Instrucciones de cuidado - [ Sallie Melena 18 to 22 of Your Pregnancy: Care Instructions ]. \" 
Revisado: 30 philippe, 2016 Versión del contenido: 11.2 © 8510-8435 [employer], Incorporated. Las instrucciones de cuidado fueron adaptadas bajo licencia por Good Help Connections (which disclaims liability or warranty for this information). Si usted tiene [place] afección médica o sobre estas instrucciones, siempre pregunte a lan profesional de emiliano. [employer], Incorporated niega toda garantía o responsabilidad por lan uso de esta información. Introducing [facility]! [facility] introduce portal paciente MyChart .  Ahora se puede acceder a partes de lan expediente médico, enviar por correo electrónico la oficina de lan médico y solicitar renovaciones de medicamentos en línea. En lan navegador de Internet , Katherine Dia a [email or web address] Jose clic en el usuario por Molly Lang? Scottie Nash clic aquí en la sesión Gerda Orf. Verá la página de registro [place]. Ingrese lan código de [employer] Jennifer tram y tod aparece a continuación. Usted no tendrá que [employer] código después de clive completado el proceso de registro . Si usted no se inscribe antes de la fecha de caducidad , debe solicitar un nuevo código. · MyChart Código de acceso : [id number] Expires: 7/3/2017  8:42 AM 
 
Ingresa los últimos cuatro dígitos de lan Número de Seguro Social ( xxxx ) y fecha de nacimiento ( dd / mm / aaaa ) tod se indica y jose clic en Enviar. Usted será llevado a la siguiente página de registro . Crear un ID MyChart . Esta será lan ID de inicio de sesión de MyChart y no puede ser [place] , por lo que pensar en harsh que es Carmen Nasreen y fácil de recordar . Crear harsh contraseña MyChart . Usted puede cambiar lan contraseña en cualquier momento . Ingrese lan Password Reset de preguntas y [place] . [facility] se puede utilizar en un momento posterior si usted olvida lan contraseña. Introduzca lan dirección de correo electrónico . Elizabeth Riggs recibirá harsh notificación por correo electrónico cuando la nueva información está disponible en MyChart . Pillo Bolanos clic en Registrarse. Jodi Abide jodi y descargar porciones de lan expediente médico. 
Jose clic en el enlace de descarga del menú Resumen para descargar harsh copia portátil de lan información médica . Si tiene [employer] , por favor visite la sección de preguntas frecuentes del sitio web MyChart . Recuerde, MyChart NO es que se utilizará para las necesidades urgentes. Para emergencias médicas , llame al 911 . Ahora disponible en lan iPhone y Android ! Por favor proporcione blue resumen de la documentación de cuidado a lan próximo proveedor. If you have any questions after today's visit, please call [phone].

## 2017-05-16 NOTE — PROGRESS NOTES
Dating Ultrasound Procedure Report    Muriel Sen is a 28 y.o.  at unknown weeks by LMP here for dating ultrasound. Role of ultrasound in pregnancy discussed with patient. Patient consents to undergo dating ultrasound. Ultrasound performed at bedside for evaluation of pregnancy. Ultrasound Type: Transabdominal    Findings: SIUP intrauterine pregnancy with EGA 18 weeks 0 days by femur length. Positive fetal movement, fetal heart beat present, normal appearing amiotic fluid, normal uterus, other maternal structures not visualized. GALA 10/17/17. Estimated Gestational Age by Ultrasound: 18 weeks 0 days    Fetal Position: Vertex    Amniotic Fluid: Normal, Amniotic Fluid Index. Placenta: Posterior     Plan:    1. Follow up with MFM for 20 week ultrasound. Dr. Parisa Booker (attending) present for entire procedure.   Farideh Montenegro MD; Family Medicine Resident

## 2017-05-16 NOTE — PROGRESS NOTES
Return OB Visit       Subjective:   Serjio Grant 28 y.o.   GALA: 10/17/2017, Alternate GALA Entry  GA:  18w0d. No complaints at this time. Diet: well balanced, healthy   Water intake: adequate   Prenatal Vitamins: taking     She is feeling her baby move. She denies vaginal bleeding, discharge or loss of fluid. She denies nausea, vomiting, severe abdominal pain or cramping. She denies dysuria. She denies headaches, dizziness or vision changes. She denies excessive swelling of extremities. Past Medical History - Reviewed today  There is no problem list on file for this patient. Medications - Reviewed today  Current Outpatient Prescriptions   Medication Sig Dispense Refill    PRENATAL VIT CALC,IRON,FOLIC (PRENATAL VITAMIN PO) Take  by mouth. Allergies - Reviewed today  No Known Allergies      Family History - Reviewed today  Family History   Problem Relation Age of Onset    GERD Mother     Parkinsonism Father          Social History - Reviewed today  Social History     Social History    Marital status:      Spouse name: N/A    Number of children: N/A    Years of education: N/A     Occupational History    Not on file.      Social History Main Topics    Smoking status: Never Smoker    Smokeless tobacco: Not on file    Alcohol use No    Drug use: Not on file    Sexual activity: Not on file     Other Topics Concern    Not on file     Social History Narrative         Objective:     Visit Vitals    /76    Pulse 76    Temp 97.8 °F (36.6 °C) (Oral)    Resp 18    Ht 4' 11\" (1.499 m)    Wt 165 lb (74.8 kg)    LMP 01/10/2017    SpO2 99%    BMI 33.33 kg/m2       Physical Exam:  GENERAL APPEARANCE: alert, well appearing, in no apparent distress  LUNGS: clear to auscultation, no wheezes, rales or rhonchi, symmetric air entry  HEART: regular rate and rhythm, no murmurs  ABDOMEN: FHT present, 145 bpm  BACK: no CVA tenderness  UTERUS: gravid  EXTREMITIES: no redness or tenderness in the calves or thighs, no edema  NEUROLOGICAL: alert, oriented, normal speech, no focal findings or movement disorder noted      Assessment   SIUP at  18w0d       ICD-10-CM ICD-9-CM    1. Prenatal care, first pregnancy, unspecified trimester Z34.00 V22.0 AMB POC URINALYSIS DIP STICK AUTO W/O MICRO      AFP TETRA SCREEN, OBSTETRICAL         Plan   - U/A showed trace LE  - 2nd trimester screen for Down's Syndrome discussed: perform today  - Ultrasound for dates - form faxed to [facility]  - Follow up in 4 weeks          Orders Placed This Encounter    AFP TETRA SCREEN, OBSTETRICAL     Order Specific Question:   Patient Height     Answer:   4     Order Specific Question:   Patient Weight     Answer:   165    AMB POC URINALYSIS DIP STICK AUTO W/O MICRO         Labor precautions discussed, including: Regular painful contractions, lasting for greater than one hour, taking your breath away; any vaginal bleeding; any leakage of fluid; or absent or decreased fetal movement. Call M.D. on call if any of these symptoms or signs occur. I have discussed the diagnosis with the patient and the intended plan as seen in the above orders. The patient has received an after-visit summary and questions were answered concerning future plans. I have discussed medication side effects and warnings with the patient as well. Patient discussed with Dr. Dennis Benitez.     José Luis Zuniga MD  Family Medicine Resident

## 2017-05-17 ENCOUNTER — TELEPHONE (OUTPATIENT)
Dept: FAMILY MEDICINE CLINIC | Age: 32
End: 2017-05-17

## 2017-05-17 NOTE — TELEPHONE ENCOUNTER
Called and spoke with patient and informed her of her upcoming appointment at [address] [place] [address] location on Tuesday 6/6/17 at 9:30am.

## 2017-05-18 LAB
2ND TRIMESTER 4 SCREEN SERPL-IMP: NORMAL
2ND TRIMESTER 4 SCREEN SERPL-IMP: NORMAL
AFP ADJ MOM SERPL: 1.06
AFP SERPL-MCNC: 43 NG/ML
AGE AT DELIVERY: 30.9 YEARS
COMMENTS, 018014: NORMAL
FET TS 18 RISK FROM MAT AGE: NORMAL
FET TS 21 RISK FROM MAT AGE: 622
GA METHOD: NORMAL
GA: 18 WEEKS
HCG ADJ MOM SERPL: 0.25
HCG SERPL-ACNC: 6633 MIU/ML
IDDM PATIENT QL: NO
INHIBIN A ADJ MOM SERPL: 0.62
INHIBIN A SERPL-MCNC: 102.24 PG/ML
Lab: NORMAL
MULTIPLE PREGNANCY: NO
NEURAL TUBE DEFECT RISK FETUS: NORMAL %
RESULTS, 017389: NORMAL
TS 18 RISK FETUS: NORMAL
TS 21 RISK FETUS: NORMAL
U ESTRIOL ADJ MOM SERPL: 1.51
U ESTRIOL SERPL-MCNC: 1.95 NG/ML

## 2017-06-06 ENCOUNTER — HOSPITAL ENCOUNTER (OUTPATIENT)
Dept: PERINATAL CARE | Age: 32
Discharge: HOME | End: 2017-06-06
Attending: OBSTETRICS & GYNECOLOGY
Payer: SUBSIDIZED

## 2017-06-06 PROCEDURE — 76805 OB US >/= 14 WKS SNGL FETUS: CPT | Performed by: OBSTETRICS & GYNECOLOGY

## 2017-06-08 NOTE — PROGRESS NOTES
Normal female fetal anatomy scan. Size c/w dates. Routine prenatal care.  F/u as clinically indicated [facility] 10/17/17

## 2017-06-16 ENCOUNTER — ROUTINE PRENATAL (OUTPATIENT)
Dept: FAMILY MEDICINE CLINIC | Age: 32
End: 2017-06-16

## 2017-06-16 VITALS
HEART RATE: 79 BPM | RESPIRATION RATE: 16 BRPM | DIASTOLIC BLOOD PRESSURE: 64 MMHG | SYSTOLIC BLOOD PRESSURE: 95 MMHG | WEIGHT: 168 LBS | TEMPERATURE: 98.1 F | OXYGEN SATURATION: 98 % | BODY MASS INDEX: 33.87 KG/M2 | HEIGHT: 59 IN

## 2017-06-16 DIAGNOSIS — Z3A.22 22 WEEKS GESTATION OF PREGNANCY: Primary | ICD-10-CM

## 2017-06-16 DIAGNOSIS — N89.8 VAGINAL ITCHING: ICD-10-CM

## 2017-06-16 LAB
BILIRUB UR QL STRIP: NEGATIVE
FERN TEST, (POC): NORMAL
GLUCOSE UR-MCNC: NEGATIVE MG/DL
KETONES P FAST UR STRIP-MCNC: NEGATIVE MG/DL
PH UR STRIP: 7.5 [PH] (ref 4.6–8)
PROT UR QL STRIP: NEGATIVE MG/DL
SP GR UR STRIP: 1.02 (ref 1–1.03)
UA UROBILINOGEN AMB POC: NORMAL (ref 0.2–1)
URINALYSIS CLARITY POC: NORMAL
URINALYSIS COLOR POC: YELLOW
URINE BLOOD POC: NEGATIVE
URINE LEUKOCYTES POC: NORMAL
URINE NITRITES POC: NEGATIVE
WET MOUNT POCT, WMPOCT: NORMAL

## 2017-06-16 NOTE — PATIENT INSTRUCTIONS
Semanas 22 a 26 de lan embarazo: Instrucciones de cuidado - [ Val Elio 22 to 26 of Your Pregnancy: Care Instructions ]  Instrucciones de cuidado    Al comenzar el 7.º mes de lan embarazo en la semana 26, los pulmones de lan bebé se están volviendo más karen y se están preparando para respirar. Podría notar que lan bebé responde al debi de lan voz o la de lan alondra. También podría notar que lan bebé se voltea y [place] menos de posición, y se retuerce o sacude más. Por lo general, las sacudidas indican que lan bebé tiene hipo. Tener hipo es totalmente normal y dura poco tiempo. Nadrew vez sea buena idea pensar en asistir a harsh clase de preparación para el parto. Blue es también un buen momento para comenzar a pensar si desea que zay el parto le administren un analgésico (medicamento para el dolor). A la mayoría de las mujeres embarazadas les hacen pruebas para la diabetes gestacional entre las semanas 24 y 29. La diabetes gestacional ocurre cuando el nivel de azúcar en la gayatri es muy alto zay el [facility]. La prueba es importante, porque usted puede tener diabetes gestacional y no saberlo. Elvin esta enfermedad puede causarle problemas a lan bebé. La atención de seguimiento es harsh parte clave de lan tratamiento y seguridad. Asegúrese de hacer y acudir a todas las citas, y llame a lan médico si está teniendo problemas. También es harsh buena idea saber los resultados de los exámenes y mantener harsh lista de los medicamentos que robin. ¿Cómo puede cuidarse en el hogar? Alivie las molestias de las patadas de lan bebé  · Cambie de posición. A veces, blue cambio hace que lan bebé también cambie de posición. · Respire hondo al mismo tiempo que levanta los brazos sobre lan [place]. Después exhale a medida que baja los brazos. Thu los ejercicios de Kegel para evitar pérdidas de [place]  · [employer] ejercicios de Kegel estando lopez o sentada. ¨ Apriete los mismos músculos que usaría para detener el chorro de [place].  El abdomen y los muslos no deben moverse. ¨ Manténgalos apretados zya 3 segundos y, luego, relájelos otros 3 segundos. ¨ Empiece con 3 segundos. Nando , añada 1 julia cada semana hasta que sea capaz de apretar zay 10 segundos. ¨ Repita el ejercicio entre 10 y 13 veces cada sesión. Thu eduardo o más sesiones cada día. Alivie o reduzca la hinchazón de los pies, los tobillos, las edna y los dedos  · Si tiene los dedos hinchados, quítese los Donna. · No coma alimentos con mucha sal, tod jonathan fritas. · Coloque mike pies sobre un banco o un sofá todo el tiempo que le sea posible. Duerma con almohadas debajo de los pies. · No se quede de pie zay mucho tiempo ni use calzado ajustado. · Use medias elásticas de soporte. ¿Dónde puede encontrar más información en inglés? Monik Toscano a http://brittani-winston.info/. Escriba G264 en la búsqueda para aprender más acerca de \"Semanas 22 a 26 de lan embarazo: Instrucciones de cuidado - [ Felizardo Drummond 22 to 26 of Your Pregnancy: Care Instructions ]. \"  Revisado: 30 philippe, 2016  Versión del contenido: 11.2  © 3798-7703 Healthwise, Incorporated. Las instrucciones de cuidado fueron adaptadas bajo licencia por [employer] (which disclaims liability or warranty for this information). Si usted tiene [place] afección médica o sobre estas instrucciones, siempre pregunte a lan profesional de emiliano. Healthwise, Incorporated niega toda garantía o responsabilidad por lan uso de esta información.

## 2017-06-16 NOTE — MR AVS SNAPSHOT
Visit Information Graciela Ledbetter Personal Médico Departamento Teléfono del Dep. Número de visita 6/16/2017  9:25 AM Leigha Salomon MD [address] [place], [place] [address]ry [phone] Upcoming Health Maintenance Date Due DTaP/Tdap/Td series (1 - Tdap) 11/20/2007 INFLUENZA AGE 9 TO ADULT 8/1/2017 PAP AKA CERVICAL CYTOLOGY 4/18/2020 Alergias  Review Complete El: 6/16/2017 Por: Claudean Mallard, MD Carnella Leyland del:  6/16/2017 No Known Allergies Vacunas actuales Genora Hitch No hay ninguna vacuna archivada. No revisadas esta visita You Were Diagnosed With   
  
 Aryan Brought 22 weeks gestation of pregnancy    -  Primary ICD-10-CM: Z3A.22 
ICD-9-CM: V22.2 Vaginal itching     ICD-10-CM: L29.8 ICD-9-CM: 698.1 Partes vitales PS Pulso Temperatura Resp Hidalgo ( percentil de crecimiento) Peso (percentil de crecimiento) 95/64 (BP 1 Location: Left arm, BP Patient Position: Sitting) 79 98.1 °F (36.7 °C) (Oral) 16 4' 11\" (1.499 m) 168 lb (76.2 kg) LMP (última micah) SpO2 BMI (IMC) Estado obstétrico Estatus de tabaquísmo 01/10/2017 98% 33.93 kg/m2 Pregnant Never Smoker Historial de signos vitales BMI and BSA Data Body Mass Index Body Surface Area  
 33.93 kg/m 2 1.78 m 2 [facility] Name Phone [facility], [address] [place] [address] Charles lista de medicamentos actualizada Lista actualizada el: 6/16/17  9:54 AM.  Rhonda Nath use charles lista de medicamentos más reciente. PRENATAL VITAMIN PO Take  by mouth. Hicimos lo siguiente AMB POC FERN TEST [40711 CPT(R)] AMB POC SMEAR, STAIN & Iris Magic MOUNT [id number] CPT(R)] AMB POC URINALYSIS DIP STICK AUTO W/O MICRO [01540 CPT(R)] Instrucciones para el Paciente Semanas 22 a 26 de lan embarazo: Instrucciones de cuidado - [ Dorothy Brad 22 to 26 of Your Pregnancy: Care Instructions ] Instrucciones de cuidado Al comenzar el 7.º mes de lan embarazo en la semana 32, los pulmones de lan bebé se están volviendo más karen y se están preparando para respirar. Podría notar que lan bebé responde al debi de lan voz o la de lan alondra. También podría notar que lan bebé se voltea y [place] menos de posición, y se retuerce o sacude más. Por lo general, las sacudidas indican que lan bebé tiene hipo. Tener hipo es totalmente normal y dura poco tiempo. Andrew vez sea buena idea pensar en asistir a harsh clase de preparación para el parto. Blue es también un buen momento para comenzar a pensar si desea que zay el parto le administren un analgésico (medicamento para el dolor). A la mayoría de las mujeres embarazadas les hacen pruebas para la diabetes gestacional entre las semanas 24 y 29. La diabetes gestacional ocurre cuando el nivel de azúcar en la gayatri es muy alto zay el [facility]. La prueba es importante, porque usted puede tener diabetes gestacional y no saberlo. Elvin esta enfermedad puede causarle problemas a lan bebé. La atención de seguimiento es harsh parte clave de lan tratamiento y seguridad. Asegúrese de hacer y acudir a todas las citas, y llame a lan médico si está teniendo problemas. También es harsh buena idea saber los resultados de los exámenes y mantener harsh lista de los medicamentos que robin. Cómo puede cuidarse en el hogar? Alivie las molestias de las patadas de lan bebé · Cambie de posición. A veces, blue cambio hace que lan bebé también cambie de posición. · Respire hondo al mismo tiempo que levanta los brazos sobre lan [place]. Después exhale a medida que baja los brazos. Thu los ejercicios de Kegel para evitar pérdidas de [place] · Puede hacer los ejercicios de Kegel estando lopez o sentada. ¨ Apriete los mismos músculos que usaría para detener el chorro de [place]. El abdomen y los muslos no deben moverse. ¨ Manténgalos apretados zay 3 segundos y, luego, relájelos otros 3 segundos. ¨ Empiece con 3 segundos. Lucrecia Flies, añada 1 julia cada semana hasta que sea capaz de apretar zay 10 segundos. ¨ Repita el ejercicio entre 10 y 13 veces cada sesión. Thu eduardo o más sesiones cada día. Alivie o reduzca la hinchazón de los pies, los tobillos, las edna y los dedos · Si tiene los dedos hinchados, quítese los [place]. · No coma alimentos con mucha sal, tod jonathan fritas. · Coloque mike pies sobre un banco o un sofá todo el tiempo que le sea posible. Duerma con almohadas debajo de los pies. · No se quede de pie zay mucho tiempo ni use calzado ajustado. · Use medias elásticas de soporte. Dónde puede encontrar más información en inglés? Radha Andrade a http://brittani-winston.info/. Escriba G264 en la búsqueda para aprender más acerca de \"Semanas 22 a 26 de lan embarazo: Instrucciones de cuidado - [ [facility] 22 to 26 of Your Pregnancy: Care Instructions ]. \" 
Revisado: 30 philippe, 2016 Versión del contenido: 11.2 © 8920-9714 Healthwise, Incorporated. Las instrucciones de cuidado fueron adaptadas bajo licencia por [employer] (which disclaims liability or warranty for this information). Si usted tiene [place] afección médica o sobre estas instrucciones, siempre pregunte a lan profesional de meiliano. Healthwise, Incorporated niega toda garantía o responsabilidad por lan uso de esta información. Introducing [facility]! [facility] introduce portal paciente MyChart . Ahora se puede acceder a partes de lan expediente médico, enviar por correo electrónico la oficina de lan médico y solicitar renovaciones de medicamentos en línea. En lan navegador de Internet , Jodee Mai a [email or web address] Thu clic en el usuario por Corrinne Broach? Maxcine Bridges clic aquí en la sesión Zhou Abrams. Verá la página de registro [place]. Ingrese lan código de [employer] tram y tod aparece a continuación. Usted no tendrá que [employer] código después de clive completado el proceso de registro . Si usted no se inscribe antes de la fecha de caducidad , debe solicitar un nuevo código. · MyChart Código de acceso : [id number] Expires: 7/3/2017  8:42 AM 
 
Ingresa los últimos cuatro dígitos de lan Número de Seguro Social ( xxxx ) y fecha de nacimiento ( dd / mm / aaaa ) tod se indica y thu clic en Enviar. Usted será llevado a la siguiente página de registro . Crear un ID MyChart . Esta será lan ID de inicio de sesión de MyChart y no puede ser [place] , por lo que pensar en harsh que es Cynthia Batters y fácil de recordar . Crear harsh contraseña MyChart . Usted puede cambiar lan contraseña en cualquier momento . Ingrese lan Password Reset de preguntas y [place] . [place] se puede utilizar en un momento posterior si usted olvida lan contraseña. Introduzca lan dirección de correo electrónico . Mikael Armenta recibirá harsh notificación por correo electrónico cuando la nueva información está disponible en MyChart . Artie Dung clic en Registrarse. Gaby Milena jodi y descargar porciones de lan expediente médico. 
Thu clic en el enlace de descarga del menú Resumen para descargar harsh copia portátil de lan información médica . Si tiene [employer] , por favor visite la sección de preguntas frecuentes del sitio web MyChart . Recuerde, MyChart NO es que se utilizará para las necesidades urgentes. Para emergencias médicas , llame al 911 . Ahora disponible en lan iPhone y Android ! Por favor proporcione blue resumen de la documentación de cuidado a aln próximo proveedor. Your primary care clinician is listed as Rene Ernandezs. If you have any questions after today's visit, please call [phone].

## 2017-06-16 NOTE — PROGRESS NOTES
Return OB Visit       Subjective:   Fernandez Cordova 28 y.o.   GALA: 10/17/2017, Alternate GALA Entry  GA:  22w3d. Complains of vaginal itching and some leakage of clear fluid for 2-3 days. Diet: well balanced, healthy   Water intake: adequate   Prenatal Vitamins: taking     She is feeling her baby move. .   She denies nausea, vomiting, severe abdominal pain or cramping. She denies dysuria. She denies headaches, dizziness or vision changes. She denies excessive swelling of extremities. Past Medical History - Reviewed today  There is no problem list on file for this patient. Medications - Reviewed today  Current Outpatient Prescriptions   Medication Sig Dispense Refill    PRENATAL VIT CALC,IRON,FOLIC (PRENATAL VITAMIN PO) Take  by mouth. Allergies - Reviewed today  No Known Allergies      Family History - Reviewed today  Family History   Problem Relation Age of Onset    GERD Mother     Parkinsonism Father          Social History - Reviewed today  Social History     Social History    Marital status:      Spouse name: N/A    Number of children: N/A    Years of education: N/A     Occupational History    Not on file.      Social History Main Topics    Smoking status: Never Smoker    Smokeless tobacco: Not on file    Alcohol use No    Drug use: Not on file    Sexual activity: Not on file     Other Topics Concern    Not on file     Social History Narrative           Objective:     Visit Vitals    BP 95/64 (BP 1 Location: Left arm, BP Patient Position: Sitting)    Pulse 79    Temp 98.1 °F (36.7 °C) (Oral)    Resp 16    Ht 4' 11\" (1.499 m)    Wt 168 lb (76.2 kg)    LMP 01/10/2017    SpO2 98%    BMI 33.93 kg/m2       Physical Exam:  GENERAL APPEARANCE: alert, well appearing, in no apparent distress  LUNGS: clear to auscultation, no wheezes, rales or rhonchi, symmetric air entry  HEART: regular rate and rhythm, no murmurs  ABDOMEN: FHT present, 150 bpm  BACK: no CVA tenderness  UTERUS: gravid, 22 cm  EXTREMITIES: no redness or tenderness in the calves or thighs, no edema  NEUROLOGICAL: alert, oriented, normal speech, no focal findings or movement disorder noted        Assessment   SIUP at  22w3d       ICD-10-CM ICD-9-CM    1. 22 weeks gestation of pregnancy Z3A.22 V22.2 AMB POC URINALYSIS DIP STICK AUTO W/O MICRO   2. Vaginal itching L29.8 698.1 AMB POC SMEAR, STAIN & INTERPRET, WET MOUNT      AMB POC FERN TEST         Plan     - U/A showed +1 LE  - Due to vaginal itching: check wet mount  - Due to complaints of LOF - check ferning.  - 2nd trimester screen for Down's Syndrome: negative  - Ultrasound 20 weeks: Normal female fetal anatomy scan. Size c/w dates. Routine prenatal care. F/u as clinically indicated [facility] 10/17/17  - Follow up in 4 weeks      Orders Placed This Encounter    AMB POC URINALYSIS DIP STICK AUTO W/O MICRO    AMB POC SMEAR, STAIN & INTERPRET, WET MOUNT    AMB POC FERN TEST         I have discussed the diagnosis with the patient and the intended plan as seen in the above orders. The patient has received an after-visit summary and questions were answered concerning future plans. I have discussed medication side effects and warnings with the patient as well. Patient discussed with Dr. Terrence Briones.     Alexander Berry MD  Family Medicine Resident

## 2017-07-11 ENCOUNTER — ROUTINE PRENATAL (OUTPATIENT)
Dept: FAMILY MEDICINE CLINIC | Age: 32
End: 2017-07-11

## 2017-07-11 VITALS
RESPIRATION RATE: 16 BRPM | WEIGHT: 177 LBS | SYSTOLIC BLOOD PRESSURE: 112 MMHG | TEMPERATURE: 98 F | DIASTOLIC BLOOD PRESSURE: 73 MMHG | OXYGEN SATURATION: 100 % | HEIGHT: 59 IN | BODY MASS INDEX: 35.68 KG/M2 | HEART RATE: 100 BPM

## 2017-07-11 DIAGNOSIS — Z34.92 PREGNANT AND NOT YET DELIVERED IN SECOND TRIMESTER: Primary | ICD-10-CM

## 2017-07-11 LAB
BILIRUB UR QL STRIP: NEGATIVE
GLUCOSE UR-MCNC: NORMAL MG/DL
KETONES P FAST UR STRIP-MCNC: NORMAL MG/DL
PH UR STRIP: 6.5 [PH] (ref 4.6–8)
PROT UR QL STRIP: NEGATIVE MG/DL
SP GR UR STRIP: 1.02 (ref 1–1.03)
UA UROBILINOGEN AMB POC: NORMAL (ref 0.2–1)
URINALYSIS CLARITY POC: NORMAL
URINALYSIS COLOR POC: YELLOW
URINE BLOOD POC: NEGATIVE
URINE LEUKOCYTES POC: NEGATIVE
URINE NITRITES POC: NEGATIVE

## 2017-07-11 NOTE — PROGRESS NOTES
Return OB Visit       Subjective:   Parvin Van 28 y.o.   GALA: 10/17/2017, Alternate GALA Entry  GA:  26w0d. No complaints at this time. Diet: well balanced, healthy   Water intake: adequate   Prenatal Vitamins: taking     She is feeling her baby move. She denies vaginal bleeding, discharge or loss of fluid. She denies nausea, vomiting, severe abdominal pain or cramping. She denies dysuria. She denies headaches, dizziness or vision changes. She denies excessive swelling of extremities. Past Medical History - Reviewed today  There is no problem list on file for this patient. Medications - Reviewed today  Current Outpatient Prescriptions   Medication Sig Dispense Refill    PRENATAL VIT CALC,IRON,FOLIC (PRENATAL VITAMIN PO) Take  by mouth. Objective:     Visit Vitals    /73    Pulse 100    Temp 98 °F (36.7 °C) (Oral)    Resp 16    Ht 4' 11\" (1.499 m)    Wt 177 lb (80.3 kg)    LMP 01/10/2017    SpO2 100%    BMI 35.75 kg/m2       Physical Exam:  GENERAL APPEARANCE: alert, well appearing, in no apparent distress  LUNGS: clear to auscultation, no wheezes, rales or rhonchi, symmetric air entry  HEART: regular rate and rhythm, no murmurs  ABDOMEN: FHT present, 145 bpm  BACK: no CVA tenderness  UTERUS: gravid, 27 cm  EXTREMITIES: no redness or tenderness in the calves or thighs, no edema  NEUROLOGICAL: alert, oriented, normal speech, no focal findings or movement disorder noted      : [id number]    Assessment   SIUP at  26w0d       ICD-10-CM ICD-9-CM    1. Pregnant and not yet delivered in second trimester Z34.82 V22.1 AMB POC URINALYSIS DIP STICK AUTO W/O MICRO      GESTATIONAL (GLUCOSE)DIAB. SCRN      CBC W/O DIFF         Plan     - U/A showed no protein, trace glucose  - Ultrasound for fetal Anomalies: posterior placenta otherwise consistent with gestational age. - Screening for gestational diabetes - 1 hour glucose is today.   - Check CBC  - Rh positive  - Tdap at next visit  - Follow up in 4 weeks      Orders Placed This Encounter    GESTATIONAL ([address] SCRN    CBC W/O DIFF    AMB POC URINALYSIS DIP STICK AUTO W/O MICRO         Labor precautions discussed, including: Regular painful contractions, lasting for greater than one hour, taking your breath away; any vaginal bleeding; any leakage of fluid; or absent or decreased fetal movement. Call M.D. on call if any of these symptoms or signs occur. I have discussed the diagnosis with the patient and the intended plan as seen in the above orders. The patient has received an after-visit summary and questions were answered concerning future plans. I have discussed medication side effects and warnings with the patient as well. Patient discussed with Dr. Lul Mercado.     Steven Garcia MD  Family Medicine Resident

## 2017-07-11 NOTE — PATIENT INSTRUCTIONS
Weeks 26 to 30 of Your Pregnancy: Care Instructions  Your Care Instructions    You are now in your last trimester of pregnancy. Your baby is growing rapidly. And you'll probably feel your baby moving around more often. Your doctor may ask you to count your baby's kicks. Your back may ache as your body gets used to your baby's size and length. If you haven't already had the Tdap shot during this pregnancy, talk to your doctor about getting it. It will help protect your  against pertussis infection. During this time, it's important to take care of yourself and pay attention to what your body needs. If you feel sexual, explore ways to be close with your partner that match your comfort and desire. Use the tips provided in this care sheet to find ways to be sexual in your own way. Follow-up care is a key part of your treatment and safety. Be sure to make and go to all appointments, and call your doctor if you are having problems. It's also a good idea to know your test results and keep a list of the medicines you take. How can you care for yourself at home? Take it easy at work  · Take frequent breaks. If possible, stop working when you are tired, and rest during your lunch hour. · Take bathroom breaks every 2 hours. · Change positions often. If you sit for long periods, stand up and walk around. · When you stand for a long time, keep one foot on a low stool with your knee bent. After standing a lot, sit with your feet up. · Avoid fumes, chemicals, and tobacco smoke. Be sexual in your own way  · Having sex during pregnancy is okay, unless your doctor tells you not to. · You may be very interested in sex, or you may have no interest at all. · Your growing belly can make it hard to find a good position during intercourse. [place] and explore. · You may get cramps in your uterus when your partner touches your breasts.   · A back rub may relieve the backache or cramps that sometimes follow orgasm. Learn about  labor  · Watch for signs of  labor. You may be going into labor if:  ¨ You have menstrual-like cramps, with or without nausea. ¨ You have about 4 or more contractions in 20 minutes, or about 8 or more within 1 hour, even after you have had a glass of water and are resting. ¨ You have a low, dull backache that does not go away when you change your position. ¨ You have pain or pressure in your pelvis that comes and goes in a pattern. ¨ You have intestinal cramping or flu-like symptoms, with or without diarrhea. ¨ You notice an increase or change in your vaginal discharge. Discharge may be heavy, mucus-like, watery, or streaked with blood. ¨ Your water breaks. · If you think you have  labor:  ¨ Drink 2 or 3 glasses of water or juice. Not drinking enough fluids can cause contractions. ¨ Stop what you are doing, and empty your bladder. Then lie down on your left side for at least 1 hour. ¨ While lying on your side, find your breast bone. Put your fingers in the soft spot just below it. Move your fingers down toward your belly button to find the top of your uterus. Check to see if it is tight. ¨ Contractions can be weak or strong. Record your contractions for an hour. Time a contraction from the start of one contraction to the start of the next one. ¨ Single or several strong contractions without a pattern are called Jossue-Cabrera contractions. They are practice contractions but not the start of labor. They often stop if you change what you are doing. ¨ Call your doctor if you have regular contractions. Where can you learn more? Go to http://brittani-winston.info/. Enter V594 in the search box to learn more about \"Weeks 26 to 30 of Your Pregnancy: Care Instructions. \"  Current as of: 2017  Content Version: 11.3  © 2710-8534 [employer].  Care instructions adapted under license by [employer] (which disclaims liability or warranty for this information). If you have questions about a medical condition or this instruction, always ask your healthcare professional. [place] [address] any warranty or liability for your use of this information.

## 2017-07-11 NOTE — MR AVS SNAPSHOT
Visit Information Nino Diane Personal Médico Departamento Teléfono del Dep. Número de visita 7/11/2017  9:25 AM Leigha Saldivar MD [address] [place] [address] [phone] Upcoming Health Maintenance Date Due DTaP/Tdap/Td series (1 - Tdap) 11/20/2007 INFLUENZA AGE 9 TO ADULT 8/1/2017 PAP AKA CERVICAL CYTOLOGY 4/18/2020 Al[facility]ias  Review Complete El: 7/11/2017 Por: Aarti Rayo LPN A partir del:  7/11/2017 No Known Allergies Vacunas actuales Rover Mean No hay ninguna vacuna archivada. No revisadas esta visita You Were Diagnosed With   
  
 [employer] Pregnant and not yet delivered in second trimester    -  Primary ICD-10-CM: Z34.82 
ICD-9-CM: V22.1 Partes vitales PS Pulso Temperatura Resp [place] ( percentil de crecimiento) Peso (percentil de crecimiento) 112/73 100 98 °F (36.7 °C) (Oral) 16 4' 11\" (1.499 m) 177 lb (80.3 kg) LMP (última micah) SpO2 BMI (C) Estado obstétrico Estatus de tabaquísmo 01/10/2017 100% 35.75 kg/m2 Pregnant Never Smoker Historial de signos vitales BMI and BSA Data Body Mass Index Body Surface Area 35.75 kg/m 2 1.83 m 2 [facility] Name [address] [place] lista de medicamentos actualizada Lista actualizada el: 7/11/17  9:44 AM.  Fernando Selvin use charles lista de medicamentos más reciente. PRENATAL VITAMIN PO Take  by mouth. Hicimos lo siguiente AMB POC URINALYSIS DIP STICK AUTO W/O MICRO [35756 CPT(R)] CBC W/O DIFF [00386 CPT(R)] GESTATIONAL (GLUCOSE)DIAB. SCRN [83985 CPT(R)] Instrucciones para el Paciente Weeks 26 to 30 of Your Pregnancy: Care Instructions Your Care Instructions You are now in your last trimester of pregnancy.  Your baby is growing rapidly. And you'll probably feel your baby moving around more often. Your doctor may ask you to count your baby's kicks. Your back may ache as your body gets used to your baby's size and length. If you haven't already had the Tdap shot during this pregnancy, talk to your doctor about getting it. It will help protect your  against pertussis infection. During this time, it's important to take care of yourself and pay attention to what your body needs. If you feel sexual, explore ways to be close with your partner that match your comfort and desire. Use the tips provided in this care sheet to find ways to be sexual in your own way. Follow-up care is a key part of your treatment and safety. Be sure to make and go to all appointments, and call your doctor if you are having problems. It's also a good idea to know your test results and keep a list of the medicines you take. How can you care for yourself at home? Take it easy at work · Take frequent breaks. If possible, stop working when you are tired, and rest during your lunch hour. · Take bathroom breaks every 2 hours. · Change positions often. If you sit for long periods, stand up and walk around. · When you stand for a long time, keep one foot on a low stool with your knee bent. After standing a lot, sit with your feet up. · Avoid fumes, chemicals, and tobacco smoke. Be sexual in your own way · Having sex during pregnancy is okay, unless your doctor tells you not to. · You may be very interested in sex, or you may have no interest at all. · Your growing belly can make it hard to find a good position during intercourse. [place] and explore. · You may get cramps in your uterus when your partner touches your breasts. · A back rub may relieve the backache or cramps that sometimes follow orgasm. Learn about  labor · Watch for signs of  labor. You may be going into labor if: ¨ You have menstrual-like cramps, with or without nausea. ¨ You have about 4 or more contractions in 20 minutes, or about 8 or more within 1 hour, even after you have had a glass of water and are resting. ¨ You have a low, dull backache that does not go away when you change your position. ¨ You have pain or pressure in your pelvis that comes and goes in a pattern. ¨ You have intestinal cramping or flu-like symptoms, with or without diarrhea. ¨ You notice an increase or change in your vaginal discharge. Discharge may be heavy, mucus-like, watery, or streaked with blood. ¨ Your water breaks. · If you think you have  labor: ¨ Drink 2 or 3 glasses of water or juice. Not drinking enough fluids can cause contractions. ¨ Stop what you are doing, and empty your bladder. Then lie down on your left side for at least 1 hour. ¨ While lying on your side, find your breast bone. Put your fingers in the soft spot just below it. Move your fingers down toward your belly button to find the top of your uterus. Check to see if it is tight. ¨ Contractions can be weak or strong. Record your contractions for an hour. Time a contraction from the start of one contraction to the start of the next one. ¨ Single or several strong contractions without a pattern are called Bay-Cabrera contractions. They are practice contractions but not the start of labor. They often stop if you change what you are doing. ¨ Call your doctor if you have regular contractions. Where can you learn more? Go to http://brittani-winston.info/. Enter V021 in the search box to learn more about \"Weeks 26 to 30 of Your Pregnancy: Care Instructions. \" Current as of: 2017 Content Version: 11.3 © 9344-5209 [employer]. Care instructions adapted under license by [facility] (which disclaims liability or warranty for this information).  If you have questions about a medical condition or this instruction, always ask your healthcare professional. [address] any warranty or liability for your use of this information. Introducing [facility]! González Ozuna introduce portal paciente MyChart . Ahora se puede acceder a partes de lan expediente médico, enviar por correo electrónico la oficina de lan médico y solicitar renovaciones de medicamentos en línea. En lan navegador de Internet , Antonio Estrella a [email or web address] Jose clic en el usuario por Rene Toussaint? Braden Pepper clic aquí en la sesión [facility]. Verá la página de registro [place]. Ingrese lan código de Bank  Jennifer tram y tod aparece a continuación. Usted no tendrá que [employer] código después de clive completado el proceso de registro . Si usted no se inscribe antes de la fecha de caducidad , debe solicitar un nuevo código. · MyChart Código de acceso : [id number] Expires: 10/9/2017  9:44 AM 
 
Ingresa los últimos cuatro dígitos de lan Número de Seguro Social ( xxxx ) y fecha de nacimiento ( dd / mm / aaaa ) tod se indica y jose clic en Enviar. Usted será llevado a la siguiente página de registro . Crear un ID MyChart . Esta será lan ID de inicio de sesión de MyChart y no puede ser [place] , por lo que pensar en harsh que es [place] y fácil de recordar . Crear harsh contraseña MyChart . Usted puede cambiar lan contraseña en cualquier momento . Ingrese lan Password Reset de preguntas y [address] . [address] se puede utilizar en un momento posterior si usted olvida lan contraseña. Introduzca lan dirección de correo electrónico . Vicente Dupont recibirá harsh notificación por correo electrónico cuando la nueva información está disponible en MyChart . Nereyda rodriguez en Registrarse. Radha Pu jodi y descargar porciones de lan expediente médico. 
Jose michael en el enlace de descarga del menú Resumen para descargar harsh copia portátil de lan información médica . Si tiene [employer] , por favor visite la sección de preguntas frecuentes del sitio web MyChart . Recuerde, MyChart NO es que se utilizará para las necesidades urgentes. Para emergencias médicas , llame al 911 . Ahora disponible en lan iPhone y Android ! Por favor proporcione blue resumen de la documentación de cuidado a lan próximo proveedor. Your primary care clinician is listed as Lavinia Altamirano. If you have any questions after today's visit, please call [phone].

## 2017-07-12 LAB
ERYTHROCYTE [DISTWIDTH] IN BLOOD BY AUTOMATED COUNT: 13.8 % (ref 12.3–15.4)
GTT GEST 2H PNL UR+SERPL: 135 MG/DL (ref 65–139)
HCT VFR BLD AUTO: 33.5 % (ref 34–46.6)
HGB BLD-MCNC: 11.2 G/DL (ref 11.1–15.9)
MCH RBC QN AUTO: 29.9 PG (ref 26.6–33)
MCHC RBC AUTO-ENTMCNC: 33.4 G/DL (ref 31.5–35.7)
MCV RBC AUTO: 89 FL (ref 79–97)
PLATELET # BLD AUTO: 254 X10E3/UL (ref 150–379)
RBC # BLD AUTO: 3.75 X10E6/UL (ref 3.77–5.28)
WBC # BLD AUTO: 11.2 X10E3/UL (ref 3.4–10.8)

## 2017-08-10 ENCOUNTER — ROUTINE PRENATAL (OUTPATIENT)
Dept: FAMILY MEDICINE CLINIC | Age: 32
End: 2017-08-10

## 2017-08-10 VITALS
OXYGEN SATURATION: 99 % | HEART RATE: 86 BPM | RESPIRATION RATE: 16 BRPM | SYSTOLIC BLOOD PRESSURE: 105 MMHG | HEIGHT: 59 IN | TEMPERATURE: 97.6 F | WEIGHT: 178 LBS | DIASTOLIC BLOOD PRESSURE: 72 MMHG | BODY MASS INDEX: 35.88 KG/M2

## 2017-08-10 DIAGNOSIS — N89.8 VAGINAL DISCHARGE: ICD-10-CM

## 2017-08-10 DIAGNOSIS — Z3A.30 30 WEEKS GESTATION OF PREGNANCY: Primary | ICD-10-CM

## 2017-08-10 LAB
BILIRUB UR QL STRIP: NEGATIVE
FERN TEST, (POC): NORMAL
GLUCOSE UR-MCNC: NORMAL MG/DL
KETONES P FAST UR STRIP-MCNC: NORMAL MG/DL
PH UR STRIP: 6.5 [PH] (ref 4.6–8)
PROT UR QL STRIP: NEGATIVE MG/DL
SP GR UR STRIP: 1.02 (ref 1–1.03)
UA UROBILINOGEN AMB POC: NORMAL (ref 0.2–1)
URINALYSIS CLARITY POC: CLEAR
URINALYSIS COLOR POC: YELLOW
URINE BLOOD POC: NEGATIVE
URINE LEUKOCYTES POC: NEGATIVE
URINE NITRITES POC: NEGATIVE

## 2017-08-10 NOTE — PROGRESS NOTES
27 w 2 d  Complaining of pelvic pain   Also complaining of mucus discharge    I reviewed with the resident the medical history and the resident's findings on the physical examination. I discussed with the resident the patient's diagnosis and concur with the plan.

## 2017-08-10 NOTE — PATIENT INSTRUCTIONS
Semanas 30 a 32 de lan embarazo: Instrucciones de cuidado - [ Raghav Copping 30 to 28 of Your Pregnancy: Care Instructions ]  Instrucciones de cuidado    Sammie Cam a los últimos meses de [facility]. A estas [place], lan bebé en verdad comienza a tener la apariencia de un bebé, con zaira y piel rellenita. A medida que entra en las últimas semanas de [facility], usted comenzará a caer en la realidad de que va a tener un bebé. Blue es el momento de elegir un nombre, ordenar lan casa, organizar un cuarto de niños seguro y buscar atención infantil de calidad, de ser necesaria. Hacer esto con anterioridad le permitirá concentrarse en cuidar y disfrutar de lan bebé. También podría hacer harsh visita a la reagan de partos del hospital para Kenney Soda mejor idea sobre qué esperar mientras está en el hospital.  Zay estos últimos meses, es muy importante que se cuide y preste atención a lo que lan cuerpo necesita. Si lan médico le dice que está john que trabaje, no se exija demasiado. Siga las recomendaciones proporcionadas en esta hoja de cuidados para aliviar la acidez estomacal y [address] várices. Si todavía no le kenyon aplicado la vacuna Tdap (tétanos, difteria y tos [place]) zay blue [facility], hable con lan médico acerca de aplicársela. Marisela Prachi a proteger a lan recién nacido contra la infección por tos ferina. La atención de seguimiento es harsh parte clave de lan tratamiento y seguridad. Asegúrese de hacer y acudir a todas las citas, y llame a lan médico si está teniendo problemas. También es harsh buena idea saber los resultados de los exámenes y mantener harsh lista de los medicamentos que robin. ¿Cómo puede cuidarse en el hogar? Preste atención a los movimientos de lan bebé  · Debería sentir que lan bebé se mueve varias veces al día. · Lan bebé ahora cambia menos de posición, y patea y da golpes con más frecuencia. · Lan bebé duerme de 20 a 45 minutos cada vez y [address] en determinados momentos del día.   · Si lan Dannielle [place] que cuente las patadas de lan bebé:  Lafrances Gwyn lan vejiga y acuéstese de lado o recuéstese en harsh silla cómoda. ¨ Anote la hora inicial.  ¨ Preste atención solo a los movimientos de lan bebé. Cuente todos los movimientos, excepto los del hipo. ¨ Después de que haya contado 10 movimientos, anote la hora. ¨ Anote cuántos minutos le llevaron a lan bebé los 10 movimientos. ¨ Si después de harsh hora no ha registrado 10 movimientos, coma o mala algo, y luego cuente por otra hora. Si no registra 10 movimientos en cualquiera de las horas, llame a lan médico.  Alivie la acidez estomacal  · Coma comidas pequeñas y frecuentes. · No coma chocolate, menta ni comidas muy picantes. Evite las bebidas con cafeína, tod el café, el té y las sodas. · Evite doblarse hacia adelante o acostarse después de comer. · Thu harsh caminata corta después de comer. · Si tiene problemas de [place] estomacal zay la noche, no coma por 2 horas antes de acostarse. · [place] antiácidos, tod Mylanta, Maalox, Rolaids o Tums. No tome antiácidos que contengan bicarbonato de sodio. Cuide las várices  · Las várices son vasos sanguíneos que se dilatan debido a la mayor cantidad de gayatri zay el embarazo. Puede tener dolor o palpitaciones en las piernas. La mayoría de las várices desaparecerán después del Adam. · Evite estar lopez zay mucho tiempo. Siéntese con las piernas cruzadas a la altura de los tobillos, no de las rodillas. · Siéntese con los pies elevados. · Evite usar ropa o medias ajustadas. Use medias de compresión. · Thu ejercicio con regularidad. Trate de caminar por lo menos 30 minutos al día. ¿Dónde puede encontrar más información en inglés? [place] a http://brittani-winston.info/. Elinda Ibarra [id number] en la búsqueda para aprender más acerca de \"Semanas 30 a 28 de lan embarazo: Instrucciones de cuidado - [ [place] Antu 30 to 28 of Your Pregnancy: Care Instructions ]. \"  Revisado: 16 marzo, 2017  Versión del contenido: 11.3  © 7800-2623 [employer]. Las instrucciones de cuidado fueron adaptadas bajo licencia por [facility] (which disclaims liability or warranty for this information). Si usted tiene [place] afección médica o sobre estas instrucciones, siempre pregunte a lan profesional de emiliano. [employer], [employer] niega toda garantía o responsabilidad por lan uso de esta información.

## 2017-08-10 NOTE — MR AVS SNAPSHOT
Visit Information Ally Sarmiento Personal Médico Departamento Teléfono del Dep. Número de visita 8/10/2017  2:00 PM Ginger Jenkins MD 1515 [facility] [phone] Your Appointments 8/24/2017  3:45 PM  
OB VISIT with Ginger Jenkins MD  
[address])  
 [address] [place] [address]  
[phone]  
  
   
 [address] Counts include 234 beds at the [facility] [id number] Upcoming Health Maintenance Date Due DTaP/Tdap/Td series (1 - Tdap) 11/20/2007 OB 3RD TRIMESTER TDAP 7/18/2017 INFLUENZA AGE 9 TO ADULT 8/1/2017 PAP AKA CERVICAL CYTOLOGY 4/18/2020 Alergias  Review Complete El: 8/10/2017 Por: Fani Bell LPN A partir del:  8/10/2017 No Known Allergies Vacunas actuales [place] Julian [place] Kid Tdap 8/10/2017 No revisadas esta visita You Were Diagnosed With   
  
 Keyla Altamirano 30 weeks gestation of pregnancy    -  Primary ICD-10-CM: Z3A.30 ICD-9-CM: V22.2 Vaginal discharge     ICD-10-CM: N89.8 ICD-9-CM: 623.5 Partes vitales PS Pulso Temperatura Resp [place] ( percentil de crecimiento) Peso (percentil de crecimiento) 105/72 (BP 1 Location: Left arm, BP Patient Position: Sitting) 86 97.6 °F (36.4 °C) (Oral) 16 4' 11\" (1.499 m) 178 lb (80.7 kg) LMP (última micah) SpO2 BMI (IMC) Estado obstétrico Estatus de tabaquísmo 01/10/2017 99% 35.95 kg/m2 Pregnant Never Smoker Historial de signos vitales BMI and BSA Data Body Mass Index Body Surface Area 35.95 kg/m 2 1.83 m 2 [facility] Name Phone [address] [place] lista de medicamentos actualizada Lista actualizada el: 8/10/17  2:54 PM.  Maryanne Vanegas use charles lista de medicamentos más reciente. PRENATAL VITAMIN PO Take  by mouth. Hicimos lo siguiente AMB POC FERN TEST [33670 CPT(R)] AMB POC URINALYSIS DIP STICK AUTO W/O MICRO [26193 CPT(R)] TETANUS, DIPHTHERIA TOXOIDS AND ACELLULAR PERTUSSIS VACCINE (TDAP), IN INDIVIDS. >=7, IM [id number] CPT(R)] Instrucciones para el Paciente Semanas 30 a 32 de lan embarazo: Instrucciones de cuidado - [ [place] 30 to 28 of Your Pregnancy: Care Instructions ] Instrucciones de cuidado Robin Limber a los últimos meses de embarazo. A estas [place], lan bebé en verdad comienza a tener la apariencia de un bebé, con zaira y piel rellenita. A medida que entra en las últimas semanas de [facility], usted comenzará a caer en la realidad de que va a tener un bebé. Carmen es el momento de elegir un nombre, ordenar lan casa, organizar un cuarto de niños seguro y buscar atención infantil de calidad, de ser necesaria. Hacer esto con anterioridad le permitirá concentrarse en cuidar y disfrutar de lan bebé. También podría hacer harsh visita a la reagan de partos del hospital para [facility] Cordon mejor idea sobre qué esperar mientras está en el hospital. 
Zay estos últimos meses, es muy importante que se cuide y preste atención a lo que lan cuerpo necesita. Si lan médico le dice que está john que trabaje, no se exija demasiado. Siga las recomendaciones proporcionadas en esta hoja de cuidados para aliviar la acidez estomacal y [address] várices. Si todavía no le kenyon aplicado la vacuna Tdap (tétanos, difteria y tos [place]) zay carmen [facility], hable con lan médico acerca de aplicársela. Lord Christelle a proteger a lan recién nacido contra la infección por tos ferina. La atención de seguimiento es harsh parte clave de lan tratamiento y seguridad. Asegúrese de hacer y acudir a todas las citas, y llame a lan médico si está teniendo problemas. También es harsh buena idea saber los resultados de los exámenes y mantener harsh lista de los medicamentos que robin. Cómo puede cuidarse en el hogar? Preste atención a los movimientos de lan bebé · Debería sentir que lan bebé se mueve varias veces al día. · Lan bebé ahora cambia menos de posición, y patea y da golpes con más frecuencia. · Lan bebé duerme de 20 a 45 minutos cada vez y [address] en determinados momentos del día. · Si lan médico quiere que cuente las patadas de lan bebé: ¨ Vacíe lan vejiga y acuéstese de lado o recuéstese en harsh silla cómoda. ¨ Anote la hora inicial. 
¨ Preste atención solo a los movimientos de lan bebé. Cuente todos los movimientos, excepto los del hipo. ¨ Después de que haya contado 10 movimientos, anote la hora. ¨ Anote cuántos minutos le llevaron a lan bebé los 10 movimientos. ¨ Si después de harsh hora no ha registrado 10 movimientos, coma o mala algo, y luego cuente por otra hora. Si no registra 10 movimientos en cualquiera de las horas, llame a lan médico. 
Alivie la acidez estomacal 
· Coma comidas pequeñas y frecuentes. · No coma chocolate, menta ni comidas muy picantes. Evite las bebidas con cafeína, tod el café, el té y las sodas. · Evite doblarse hacia adelante o acostarse después de comer. · Thu harsh caminata corta después de comer. · Si tiene problemas de [place] estomacal zay la noche, no coma por 2 horas antes de acostarse. · [place] antiácidos, tod Mylanta, Maalox, Rolaids o Tums. No tome antiácidos que contengan bicarbonato de sodio. [address] · Las várices son vasos sanguíneos que se dilatan debido a la mayor cantidad de gayatri zay el embarazo. Puede tener dolor o palpitaciones en las piernas. La mayoría de las várices desaparecerán después del Adam. · Evite estar lopez zay mucho tiempo. Siéntese con las piernas cruzadas a la altura de los tobillos, no de las rodillas. · Siéntese con los pies elevados. · Evite usar ropa o medias ajustadas. Use medias de compresión. · Thu ejercicio con regularidad. Trate de caminar por lo menos 30 minutos al día. Dónde puede encontrar más información en inglés? Rita Alvarez a http://brittani-winston.info/. Paty Santos R447 en la búsqueda para aprender más acerca de \"Semanas 30 a 28 de lan embarazo: Instrucciones de cuidado - [ [place] 30 to 28 of Your Pregnancy: Care Instructions ]. \" 
Revisado: 16 marzo, 2017 Versión del contenido: 11.3 © 4790-3343 Healthwise, Incorporated. Las instrucciones de cuidado fueron adaptadas bajo licencia por Good ProfitPoint Connections (which disclaims liability or warranty for this information). Si usted tiene [place] afección médica o sobre estas instrucciones, siempre pregunte a lan profesional de emiliano. Healthwise, Incorporated niega toda garantía o responsabilidad por lan uso de esta información. Introducing [facility] SERVICES! [facility] introduce portal paciente MyChart . Ahora se puede acceder a partes de lan expediente médico, enviar por correo electrónico la oficina de lan médico y solicitar renovaciones de medicamentos en línea. En lan navegador de Internet , [employer] a https://Satispay. IDX Corp/Ubiquisyst Jose clic en el usuario por Kj Oliver? Rush Ligas clic aquí en la sesión Francisca Sommers. Verá la página de registro [place]. Ingrese lan código de Bank of Jennifer tram y tod aparece a continuación. Usted no tendrá que [employer] código después de clive completado el proceso de registro . Si usted no se inscribe antes de la fecha de caducidad , debe solicitar un nuevo código. · MyChart Código de acceso : [id number] Expires: 10/9/2017  9:44 AM 
 
Ingresa los últimos cuatro dígitos de lan Número de Seguro Social ( xxxx ) y fecha de nacimiento ( dd / mm / aaaa ) tod se indica y jose clic en Enviar. Usted será llevado a la siguiente página de registro . Crear un ID MyChart . Esta será lan ID de inicio de sesión de MyChart y no puede ser [place] , por lo que pensar en harsh que es Deneise Pea y fácil de recordar . Crear harsh contraseña MyChart . Usted puede cambiar lan contraseña en cualquier momento . Ingrese lan Password Reset de preguntas y [place] . [place] se puede utilizar en un momento posterior si usted olvida lan contraseña. Introduzca lan dirección de correo electrónico . Marcus Gilman recibirá harsh notificación por correo electrónico cuando la nueva información está disponible en MyChart . Blanca Knock michael en Registrarse. Rosalie Hindu jodi y descargar porciones de lan expediente médico. 
Thu clic en el enlace de descarga del menú Resumen para descargar harsh copia portátil de lan información médica . Si tiene Joanne Stevens , por favor visite la sección de preguntas frecuentes del sitio web MyChart . Recuerde, MyChart NO es que se utilizará para las necesidades urgentes. Para emergencias médicas , llame al 911 . Ahora disponible en lan iPhone y Android ! Por favor proporcione blue resumen de la documentación de cuidado a lan próximo proveedor. Your primary care clinician is listed as Josefa Keep. If you have any questions after today's visit, please call [phone].

## 2017-08-10 NOTE — PROGRESS NOTES
Return OB Visit       Subjective:   Vance Marrow 28 y.o.   GALA: 10/17/2017, Alternate GALA Entry  GA:  30w2d. Complains of b/l pelvic pain that comes and goes. Denies bleeding. Also has leakage of fluid from the vagina. Fluid is clear-white. Denies itching or burning. Diet: well balanced, healthy   Water intake: adequate   Prenatal Vitamins: taking     She is feeling her baby move. She denies nausea, vomiting. She denies dysuria. She denies headaches, dizziness or vision changes. She denies excessive swelling of extremities. Objective:     Visit Vitals    Resp 16    Ht 4' 11\" (1.499 m)    Wt 178 lb (80.7 kg)    LMP 01/10/2017    BMI 35.95 kg/m2       Physical Exam:  GENERAL APPEARANCE: alert, well appearing, in no apparent distress  LUNGS: clear to auscultation, no wheezes, rales or rhonchi, symmetric air entry  HEART: regular rate and rhythm, no murmurs  ABDOMEN: FHT present, 155 bpm  BACK: no CVA tenderness  UTERUS: gravid, 30 cm  EXTREMITIES: no redness or tenderness in the calves or thighs, no edema  NEUROLOGICAL: alert, oriented, normal speech, no focal findings or movement disorder noted  PELVIC: chaperoned by BRYAN FARIAS [facility], LPN. [place] vaginal mucosa, whitish vaginal discharge, no pooling, cervix appears closed. Assessment   SIUP at  30w2d       ICD-10-CM ICD-9-CM    1. 30 weeks gestation of pregnancy Z3A.30 V22.2 AMB POC URINALYSIS DIP STICK AUTO W/O MICRO     : [id number]    Plan     - U/A showed 3+ ketones. Patient not hydrating as much as she should. - Vaginal fluid: exam negative for pooling, no ferning present on slide. Wet mount with scant clue cells but patient otherwise asymptomatic. Likely just pregnancy discharge.   - TDAP today  - negative 1hr gtt  - RH pos  - Follow up in 2 weeks      Orders Placed This Encounter    AMB POC URINALYSIS DIP STICK AUTO W/O MICRO         Labor precautions discussed, including: Regular painful contractions, lasting for greater than one hour, taking your breath away; any vaginal bleeding; any leakage of fluid; or absent or decreased fetal movement. Call M.D. on call if any of these symptoms or signs occur. I have discussed the diagnosis with the patient and the intended plan as seen in the above orders. The patient has received an after-visit summary and questions were answered concerning future plans. I have discussed medication side effects and warnings with the patient as well. Patient discussed with Dr. Yazan Salvador.     Isis Villalba MD  Family Medicine Resident

## 2017-08-24 ENCOUNTER — ROUTINE PRENATAL (OUTPATIENT)
Dept: FAMILY MEDICINE CLINIC | Age: 32
End: 2017-08-24

## 2017-08-24 VITALS
HEART RATE: 100 BPM | SYSTOLIC BLOOD PRESSURE: 116 MMHG | HEIGHT: 59 IN | BODY MASS INDEX: 35.28 KG/M2 | DIASTOLIC BLOOD PRESSURE: 79 MMHG | TEMPERATURE: 98.2 F | WEIGHT: 175 LBS | RESPIRATION RATE: 18 BRPM | OXYGEN SATURATION: 96 %

## 2017-08-24 DIAGNOSIS — Z3A.32 32 WEEKS GESTATION OF PREGNANCY: Primary | ICD-10-CM

## 2017-08-24 LAB
BILIRUB UR QL STRIP: NEGATIVE
GLUCOSE UR-MCNC: NEGATIVE MG/DL
KETONES P FAST UR STRIP-MCNC: NORMAL MG/DL
PH UR STRIP: 7 [PH] (ref 4.6–8)
PROT UR QL STRIP: NEGATIVE MG/DL
SP GR UR STRIP: 1.02 (ref 1–1.03)
UA UROBILINOGEN AMB POC: NORMAL (ref 0.2–1)
URINALYSIS CLARITY POC: CLEAR
URINALYSIS COLOR POC: YELLOW
URINE BLOOD POC: NEGATIVE
URINE LEUKOCYTES POC: NORMAL
URINE NITRITES POC: NEGATIVE

## 2017-08-24 NOTE — MR AVS SNAPSHOT
Visit Information Damián Caldwell jae Benedicto Personal Médico Departamento Teléfono del Dep. Número de visita 8/24/2017  3:45 PM Leigha Palmer MD [address] [facility] [phone] Upcoming Health Maintenance Date Due INFLUENZA AGE 9 TO ADULT 8/1/2017 PAP AKA CERVICAL CYTOLOGY 4/18/2020 DTaP/Tdap/Td series (2 - Td) 8/10/2027 Alergias  Review Complete El: 8/24/2017 Por: [place] A partir del:  8/24/2017 No Known Allergies Vacunas actuales Simin Alt Kenia Emilia Tdap 8/10/2017 No revisadas esta visita You Were Diagnosed With   
  
 [address] 32 weeks gestation of pregnancy    -  Primary ICD-10-CM: Z3A.32 
ICD-9-CM: V22.2 Partes vitales PS Pulso Temperatura Resp [place] ( percentil de crecimiento) Peso (percentil de crecimiento) 116/79 (BP 1 Location: Right arm, BP Patient Position: Sitting) 100 98.2 °F (36.8 °C) (Oral) 18 4' 11\" (1.499 m) 175 lb (79.4 kg) LMP (última micah) SpO2 BMI (IMC) Estado obstétrico Estatus de tabaquísmo 01/10/2017 96% 35.35 kg/m2 Pregnant Never Smoker Historial de signos vitales BMI and BSA Data Body Mass Index Body Surface Area  
 35.35 kg/m 2 1.82 m 2 [facility] Name Phone [address] [place] lista de medicamentos actualizada Lista actualizada el: 8/24/17  4:03 PM.  Funmi Margo use charles lista de medicamentos más reciente. PRENATAL VITAMIN PO Take  by mouth. Hicimos lo siguiente URINALYSIS W/ RFLX MICROSCOPIC [61632 CPT(R)] Instrucciones para el Paciente Semanas 32 a 34 de charles embarazo: Instrucciones de cuidado - [ Kaitlin Corcoran 32 to 29 of Your Pregnancy: Care Instructions ] Instrucciones de cuidado [address] charles Odean [place], usted podría sentir [place] josue y [place]. Es importante que descanse cuando pueda. [place] bebé en crecimiento está ejerciendo más presión sobre charles vejiga. Por eso, tram vez necesite orinar con más frecuencia. Las hemorroides también son comunes. Estas son venas en la monster del recto que causan dolor y comezón. En la semana 36, a la mayoría de las [employer] un análisis para detectar el estreptococo del abiel B (GBS, por mike siglas en inglés). El GBS es harsh bacteria común que puede vivir en la vagina y el recto. Podría enfermar a charles bebé después del nacimiento. Si el May [employer]orporated positivo, le darán antibióticos zay el Anahi Ion de Adam. Estos prevendrán que el bebé se contagie con la bacteria. Podría convenirle hablar con charles médico sobre poner en un banco la gayatri del cordón umbilical de charles bebé. Esta es la gayatri que queda en el cordón después del nacimiento. Si desea guardar esta gayatri, debe hacer los trámites necesarios con anticipación. No puede decidirlo en el último minuto. Si todavía no le kenyon aplicado la vacuna Tdap (tétanos, difteria y tos [place]) zay blue [place], hable con charles médico acerca de aplicársela. Lemon [place]gers a proteger a charles recién nacido contra la infección por tos ferina. La atención de seguimiento es harsh parte clave de charles tratamiento y seguridad. Asegúrese de hacer y acudir a todas las citas, y llame a charles médico si está teniendo problemas. También es harsh buena idea saber los resultados de los exámenes y mantener harsh lista de los medicamentos que robin. Cómo puede cuidarse en el hogar? [place] hemorroides · Aumente en charles dieta la cantidad de líquidos, frutas, verduras y [place]. [place] ayudará a Hazel Lezama. · Evite estar sentada zay demasiado tiempo. Recuéstese sobre el lado ag varias veces al día. · Límpiese con papel higiénico suave y húmedo.  O puede utilizar compresas de infusión de hamamelis virginiana (\"witch hazel\") o toallas de higiene personal. 
 · Si tiene malestar, pruebe a usar compresas de hielo. O puede hacer erika de asiento tibios. Hágalos zay 20 minutos por vez, según lo necesite. · Use harsh crema con hidrocortisona para el dolor y la picazón. Sapna Roughen son Anusol y Preparation H Hydrocortisone. · Pregúntele a lan médico si puede bud un ablandador de heces de venta domonique. Considere el amamantamiento · Los expertos recomiendan que las mujeres amamanten por 1 año o [place]. La leche materna es el mejor alimento para los bebés. · A los bebés les resulta más fácil digerir la [place] materna que la [place] de [place]. Y siempre está disponible, tiene la temperatura ideal y es gratuita. · En general, los bebés que se alimentan con [address] son más sanos que los bebés que se alimentan con leche de [place]. [place] son menos propensos a tener infecciones de oído, resfriados, diarrea y neumonía. ¨ Los bebés que solo nasima [place] materna son menos propensos a desarrollar asma y alergias. Tony son menos propensos a ser obesos. Tony son menos propensos a desarrollar diabetes o enfermedades del corazón. · [employer] a krysten bebés tienen menos sangrado después del [place]. Krysten úteros también recobran lan tamaño normal más rápido. · Algunas mujeres que amamantan bajan de [place] rápido. Elaborar leche quema calorías. · El amamantamiento puede disminuir el riesgo de tener cáncer de seno, cáncer de ovarios y osteoporosis. Decida sobre la circuncisión para los niños · Al bud esta decisión, podría ser de ayuda pensar en krysten tradiciones personales, religiosas y familiares. Es lan decisión si desea conservar el pene de lan hijo natural o circuncidar a lan hijo. · Si decide que le gustaría que circuncidaran a lan bebé, hable con lan médico. Discuta cualquier inquietud que tenga sobre el dolor.  Saba Arreaga puede hablar acerca de mike preferencias para la anestesia. Dónde puede encontrar más información en inglés? Oly Kline a http://brittani-winston.info/. Yanelis Bourgeois E054 en la búsqueda para aprender más acerca de \"Semanas 28 a 29 de lan embarazo: Instrucciones de cuidado - [ Rebbecca Prisca 32 to 29 of Your Pregnancy: Care Instructions ]. \" 
Revisado: 16 marzo, 2017 Versión del contenido: 11.3 © 7971-6773 Healthwise, Incorporated. Las instrucciones de cuidado fueron adaptadas bajo licencia por Good Help Connections (which disclaims liability or warranty for this information). Si usted tiene [place] afección médica o sobre estas instrucciones, siempre pregunte a lan profesional de emiliano. Healthwise, Incorporated niega toda garantía o responsabilidad por lan uso de esta información. Introducing [facility]! [facility] introduce portal paciente UP Onlinehart . Ahora se puede acceder a partes de lan expediente médico, enviar por correo electrónico la oficina de lan médico y solicitar renovaciones de medicamentos en línea. En lan navegador de Internet , [place] a [email or web address]. AeroDynEnergy. Digitwhiz/Techulonhart Jose clic en el usuario por Ferstanton Thomas? Hilary Peeling clic aquí en la sesión [facility]. Verá la página de registro [place]. Ingrese lan código de Bank of Jennifer tram y tod aparece a continuación. Usted no tendrá que UnumProvident código después de clive completado el proceso de registro . Si usted no se inscribe antes de la fecha de caducidad , debe solicitar un nuevo código. · MyChart Código de acceso : [id number] Expires: 10/9/2017  9:44 AM 
 
Ingresa los últimos cuatro dígitos de lan Número de Seguro Social ( xxxx ) y fecha de nacimiento ( dd / mm / aaaa ) tod se indica y jose clic en Enviar. Usted será llevado a la siguiente página de registro . Crear un ID MyChart .  Esta será lan ID de inicio de sesión de MyChart y no puede ser [place] , por lo que pensar en harsh que es lemos y fácil de recordar . Crear harsh contraseña MyChart . Usted puede cambiar lan contraseña en cualquier momento . Ingrese lan Password Reset de preguntas y [place] . [place] se puede utilizar en un momento posterior si usted olvida lan contraseña. Introduzca lan dirección de correo electrónico . Andres Diehl recibirá harsh notificación por correo electrónico cuando la nueva información está disponible en MyChart . Bre [place] clic en Registrarse. Cherylene Founds jodi y descargar porciones de lan expediente médico. 
Thu clic en el enlace de descarga del menú Resumen para descargar harsh copia portátil de lan información médica . Si tiene [employer] , por favor visite la sección de preguntas frecuentes del sitio web MyChart . Recuerde, MyChart NO es que se utilizará para las necesidades urgentes. Para emergencias médicas , llame al 911 . Ahora disponible en lan iPhone y Android ! Por favor proporcione blue resumen de la documentación de cuidado a lan próximo proveedor. Your primary care clinician is listed as Heide Moreira. If you have any questions after today's visit, please call [phone].

## 2017-08-24 NOTE — PATIENT INSTRUCTIONS
Semanas 32 a 34 de charles embarazo: Instrucciones de cuidado - [ [place] 32 to 29 of Your Pregnancy: Care Instructions ]  Instrucciones de cuidado    Torsten las últimas semanas de charles Tonia lamberto podría sentir más josue y [place]. Es importante que descanse cuando pueda. Charles bebé en crecimiento está ejerciendo más presión sobre charles vejiga. Por eso, tram vez necesite orinar con más frecuencia. Las hemorroides también son comunes. Estas son venas en la monster del recto que causan dolor y comezón. En la semana 36, a la mayoría de las [employer] un análisis para detectar el estreptococo del abiel B (GBS, por mike siglas en inglés). El GBS es harsh bacteria común que puede vivir en la vagina y el recto. Podría enfermar a charles bebé después del nacimiento. Si el [employer] positivo, le darán antibióticos torsten el [place] de [place]. Estos prevendrán que el bebé se contagie con la bacteria. Podría convenirle hablar con charles médico sobre poner en un banco la gayatri del cordón umbilical de charles bebé. Esta es la gayatri que queda en el cordón después del nacimiento. Si desea guardar esta gayatri, debe hacer los trámites necesarios con anticipación. No puede decidirlo en el último minuto. Si todavía no le kenyon aplicado la vacuna Tdap (tétanos, difteria y tos [place]) torsten blue Tonia, hable con charles médico acerca de aplicársela. Lavinia Macho a proteger a charles recién nacido contra la infección por tos ferina. La atención de seguimiento es harsh parte clave de charles tratamiento y seguridad. Asegúrese de hacer y acudir a todas las citas, y llame a charles médico si está teniendo problemas. También es harsh buena idea saber los resultados de los exámenes y mantener harsh lista de los medicamentos que robin. ¿Cómo puede cuidarse en el hogar? Alivio de las hemorroides  · Aumente en charles dieta la cantidad de líquidos, frutas, verduras y [place]. [place] ayudará a BERENICE Demarco, Haz[place]. · Evite estar sentada torsten demasiado tiempo.  Recuéstese sobre el lado ag varias veces al día. · Límpiese con papel higiénico suave y húmedo. O puede utilizar compresas de infusión de hamamelis [facility] (\"witch hazel\") o toallas de higiene personal.  · Si tiene malestar, pruebe a usar compresas de hielo. O puede hacer erika de asiento tibios. Hágalos zay 20 minutos por vez, según lo necesite. · Use harsh crema con hidrocortisona para el dolor y la picazón. Stormy Lyme son Anusol y Preparation H Hydrocortisone. · Pregúntele a lan médico si puede bud un ablandador de heces de venta domonique. Considere el amamantamiento  · Los expertos recomiendan que las mujeres amamanten por Jadon Castelan. La leche materna es el mejor alimento para los bebés. · A los bebés les resulta más fácil digerir la [employer] materna que la [employer] de [place]. Y siempre está disponible, tiene la temperatura ideal y es gratuita. · En general, los bebés que se alimentan con [address] son más sanos que los bebés que se alimentan con leche de [place]. Tony son menos propensos a tener infecciones de oído, resfriados, diarrea y neumonía. ¨ Los bebés que solo nasima [employer] materna son menos propensos a desarrollar asma y alergias. Tony son menos propensos a ser obesos. Tony son menos propensos a desarrollar diabetes o enfermedades del corazón. · [employer] a krysten bebés tienen menos sangrado después del Adam. Krysten úteros también recobran lan tamaño normal más rápido. · Algunas mujeres que amamantan bajan de [place] rápido. Elaborar leche quema calorías. · El amamantamiento puede disminuir el riesgo de tener cáncer de seno, cáncer de ovarios y osteoporosis. Decida sobre la circuncisión para los niños  · Al bud esta decisión, podría ser de ayuda pensar en krysten tradiciones personales, religiosas y familiares.  Es lan decisión si desea conservar el pene de lan hijo natural o circuncidar a lan hijo.  · Si decide que le gustaría que circuncidaran a lan bebé, hable con lan médico. Discuta cualquier inquietud que tenga sobre el dolor. También puede hablar acerca de mike preferencias para la anestesia. ¿Dónde puede encontrar más información en inglés? Gabriela Goldman a [email or web address]-winston.[email or web address] Harpreet [facility] R810 en la búsqueda para aprender más acerca de \"Semanas 28 a 29 de lan embarazo: Instrucciones de cuidado - [ [place]y Me[place] 32 to 29 of Your Pregnancy: Care Instructions ]. \"  Revisado: 16 marzo, 2017  Versión del contenido: 11.3  © 2290-5841 Healthwise, Incorporated. Las instrucciones de cuidado fueron adaptadas bajo licencia por [employer] (which disclaims liability or warranty for this information). Si usted tiene [place] afección médica o sobre estas instrucciones, siempre pregunte a lan profesional de emiliano. [employer]ang, [employer] niega toda garantía o responsabilidad por lan uso de esta información.

## 2017-08-24 NOTE — PROGRESS NOTES
Return OB Visit       Subjective:   Norma Stairs 28 y.o. Sissy Cook   GALA: 10/17/2017 by 2nd trimester US. GA:  32w2d. No complaints at this time. : [id number]    Diet: well balanced, healthy   Water intake: adequate   Prenatal Vitamins: taking     She is feeling her baby move. She denies vaginal bleeding, discharge or loss of fluid. She denies nausea, vomiting, severe abdominal pain or cramping. She denies dysuria. She denies headaches, dizziness or vision changes. She denies excessive swelling of extremities.       Objective:     Visit Vitals    /79 (BP 1 Location: Right arm, BP Patient Position: Sitting)    Pulse 100    Temp 98.2 °F (36.8 °C) (Oral)    Resp 18    Ht 4' 11\" (1.499 m)    Wt 175 lb (79.4 kg)    LMP 01/10/2017    SpO2 96%    BMI 35.35 kg/m2       Physical Exam:  GENERAL APPEARANCE: alert, well appearing, in no apparent distress  LUNGS: clear to auscultation, no wheezes, rales or rhonchi, symmetric air entry  HEART: regular rate and rhythm, no murmurs  ABDOMEN: FHT present, 130 bpm  BACK: no CVA tenderness  UTERUS: gravid, 32 cm  EXTREMITIES: no redness or tenderness in the calves or thighs, no edema  NEUROLOGICAL: alert, oriented, normal speech, no focal findings or movement disorder noted        Assessment   SIUP at  32w2d       ICD-10-CM ICD-9-CM    1. 32 weeks gestation of pregnancy Z3A.32 V22.2 AMB POC URINALYSIS DIP STICK AUTO W/O MICRO      CANCELED: URINALYSIS W/ RFLX MICROSCOPIC         Plan     - U/A normal  - 2nd trimester screen for Down's Syndrome discussed: AFP tetra normal.  - 20 week US: normal anatomy, size consistent with dates  - Tdap on 8/10  - Neg 1hr OGTT  - RH pos  - Follow up in 2 weeks    Orders Placed This Encounter    AMB POC URINALYSIS DIP STICK AUTO W/O MICRO         Labor precautions discussed, including: Regular painful contractions, lasting for greater than one hour, taking your breath away; any vaginal bleeding; any leakage of fluid; or absent or decreased fetal movement. Call M.D. on call if any of these symptoms or signs occur. I have discussed the diagnosis with the patient and the intended plan as seen in the above orders. The patient has received an after-visit summary and questions were answered concerning future plans. I have discussed medication side effects and warnings with the patient as well. Patient discussed with Dr. Deonte Samuels.     Ganesh Taveras MD  Family Medicine Resident

## 2017-09-07 ENCOUNTER — ROUTINE PRENATAL (OUTPATIENT)
Dept: FAMILY MEDICINE CLINIC | Age: 32
End: 2017-09-07

## 2017-09-07 VITALS
RESPIRATION RATE: 16 BRPM | TEMPERATURE: 98.7 F | SYSTOLIC BLOOD PRESSURE: 113 MMHG | HEIGHT: 59 IN | BODY MASS INDEX: 35.88 KG/M2 | WEIGHT: 178 LBS | DIASTOLIC BLOOD PRESSURE: 75 MMHG | HEART RATE: 69 BPM | OXYGEN SATURATION: 99 %

## 2017-09-07 DIAGNOSIS — Z3A.34 34 WEEKS GESTATION OF PREGNANCY: Primary | ICD-10-CM

## 2017-09-07 LAB
BILIRUB UR QL STRIP: NEGATIVE
GLUCOSE UR-MCNC: NEGATIVE MG/DL
KETONES P FAST UR STRIP-MCNC: NEGATIVE MG/DL
PH UR STRIP: 6.5 [PH] (ref 4.6–8)
PROT UR QL STRIP: NEGATIVE MG/DL
SP GR UR STRIP: 1.01 (ref 1–1.03)
UA UROBILINOGEN AMB POC: NORMAL (ref 0.2–1)
URINALYSIS CLARITY POC: CLEAR
URINALYSIS COLOR POC: YELLOW
URINE BLOOD POC: NEGATIVE
URINE LEUKOCYTES POC: NEGATIVE
URINE NITRITES POC: NEGATIVE

## 2017-09-07 NOTE — MR AVS SNAPSHOT
Visit Information Taylor Crawford Personal Médico Departamento Teléfono del Dep. Número de visita 9/7/2017  2:30 PM Arian Casanova MD Elizabeth Ordoñez [facility] [phone] Follow-up Instructions Return in about 1 week (around 9/14/2017). Your Appointments 9/15/2017 10:00 AM  
OB VISIT with Mary Jo Davis MD  
Elizabeth Ordoñez [facility]) Appt Note: 35 weeks [address] [place] [address]  
[phone]  
  
   
 [address] [phone] Upcoming Health Maintenance Date Due INFLUENZA AGE 9 TO ADULT 8/1/2017 PAP AKA CERVICAL CYTOLOGY 4/18/2020 DTaP/Tdap/Td series (2 - Td) 8/10/2027 Alergias  Review Complete El: 9/7/2017 Por: Antione Stephens LPN A partir del:  9/7/2017 No Known Allergies Vacunas actuales Sourav Irizarry Tdap 8/10/2017 No revisadas esta visita You Were Diagnosed With   
  
 Parisa Orn Pregnancy, unspecified gestational age    -  Primary ICD-10-CM: Z33.3 ICD-9-CM: V22.2 Partes vitales PS Pulso Temperatura Resp [place] ( percentil de crecimiento) Peso (percentil de crecimiento) 113/75 (BP 1 Location: Left arm, BP Patient Position: Sitting) 69 98.7 °F (37.1 °C) (Oral) 16 4' 11\" (1.499 m) 178 lb (80.7 kg) LMP (última micah) SpO2 BMI (IMC) Estado obstétrico Estatus de tabaquísmo 01/10/2017 99% 35.95 kg/m2 Pregnant Never Smoker Historial de signos vitales BMI and BSA Data Body Mass Index Body Surface Area 35.95 kg/m 2 1.83 m 2 [facility] Name Phone [address] Charles lista de medicamentos actualizada Lista actualizada el: 9/7/17  3:36 PM.  Timothy King use charles lista de medicamentos más reciente. PRENATAL VITAMIN PO Take  by mouth. Hicimos lo siguiente AMB POC URINALYSIS DIP STICK AUTO W/O MICRO [07194 CPT(R)] Instrucciones de seguimiento Return in about 1 week (around 9/14/2017). Introducing [facility]! [facility] introduce portal paciente MyChart . Ahora se puede acceder a partes de lan expediente médico, enviar por correo electrónico la oficina de lan médico y solicitar renovaciones de medicamentos en línea. En lan navegador de Internet , Reji Sharp a [email or web address] Jose clic en el usuario por [place]? El[place] clic aquí en la sesión Laverda Riri. Verá la página de registro [place]. Ingrese lan código de [employer] Jennifer tram y tod aparece a continuación. Usted no tendrá que [employer] código después de clive completado el proceso de registro . Si usted no se inscribe antes de la fecha de caducidad , debe solicitar un nuevo código. · MyChart Código de acceso : [id number] Expires: 10/9/2017  9:44 AM 
 
Ingresa los últimos cuatro dígitos de lan Número de Seguro Social ( xxxx ) y fecha de nacimiento ( dd / mm / aaaa ) tod se indica y jose clic en Enviar. Usted será llevado a la siguiente página de registro . Crear un ID MyChart . Esta será lan ID de inicio de sesión de MyChart y no puede ser [place] , por lo que pensar en harsh que es Laurance Veronica y fácil de recordar . Crear harsh contraseña MyChart . Usted puede cambiar lan contraseña en cualquier momento . Ingrese lan Password Reset de preguntas y [place] . [place] se puede utilizar en un momento posterior si usted olvida lan contraseña. Introduzca lan dirección de correo electrónico . [place] recibirá harsh notificación por correo electrónico cuando la nueva información está disponible en MyChart . Nhi Late clic en Registrarse. Antony Miners jodi y descargar porciones de lan expediente médico. 
Jsoe clic en el enlace de descarga del menú Resumen para descargar harsh copia portátil de lan información médica . Si tiene [employer] , por favor visite la sección de preguntas frecuentes del sitio web MyChart . Recuerde, MyChart NO es que se utilizará para las necesidades urgentes. Para emergencias médicas , llame al 911 . Ahora disponible en lan iPhone y Android ! Por favor proporcione blue resumen de la documentación de cuidado a lan próximo proveedor. Your primary care clinician is listed as Ondina Grajeda. If you have any questions after today's visit, please call [phone].

## 2017-09-07 NOTE — PROGRESS NOTES
Return OB Visit       [facility]:   [facility]   GALA: 10/17/2017,GA:  34w2d. No complaints at this time. Diet: well balanced, healthy   Water intake: adequate   Prenatal Vitamins: taking   She is feeling her baby move. She denies vaginal bleeding, discharge or loss of fluid. She denies nausea, vomiting, severe abdominal pain or cramping. She denies dysuria. She denies headaches, dizziness or vision changes. She denies excessive swelling of extremities. Past Medical History - Reviewed today  There is no problem list on file for this patient. Medications - Reviewed today  Current Outpatient Prescriptions   Medication Sig Dispense Refill    PRENATAL VIT CALC,IRON,FOLIC (PRENATAL VITAMIN PO) Take  by mouth. Allergies - Reviewed today  No Known Allergies    Family History - Reviewed today  Family History   Problem Relation Age of Onset    GERD Mother     Parkinsonism Father      Social History - Reviewed today  Social History     Social History    Marital status:      Spouse name: N/A    Number of children: N/A    Years of education: N/A     Occupational History    Not on file.      Social History Main Topics    Smoking status: Never Smoker    Smokeless tobacco: Never Used    Alcohol use No    Drug use: Not on file    Sexual activity: Not on file     Other Topics Concern    Not on file     Social History Narrative         Health Maintenance - Reviewed today   Immunizations:     -Influenza: Pt to consent to flu shot at next prenatal visit     -Tdap: Up to date     Screening:     -Pap smear: WNL      Objective:     Visit Vitals    /75 (BP 1 Location: Left arm, BP Patient Position: Sitting)    Pulse 69    Temp 98.7 °F (37.1 °C) (Oral)    Resp 16    Ht 4' 11\" (1.499 m)    Wt 178 lb (80.7 kg)    LMP 01/10/2017    SpO2 99%    BMI 35.95 kg/m2       Physical Exam:  GENERAL APPEARANCE: alert, well appearing, in no apparent distress  LUNGS: clear to auscultation, no wheezes, rales or rhonchi, symmetric air entry  HEART: regular rate and rhythm, no murmurs  ABDOMEN: fundus soft, nontender 34 cm and FHT present, 142 bpm  BACK: no CVA tenderness  UTERUS: gravid and consistent with 34 weeks, 34 cm  EXTREMITIES: no redness or tenderness in the calves or thighs, no edema, intact peripheral pulses  NEUROLOGICAL: alert, oriented, normal speech, no focal findings or movement disorder noted  PELVIC: examination not indicated. Assessment   SIUP at  34w2d       ICD-10-CM ICD-9-CM    1. Pregnancy, unspecified gestational age Z33.3 V22.2 AMB POC URINALYSIS DIP STICK AUTO W/O MICRO         Plan     · 34 weeks 2 days Gestation  - U/A showed no abnormalities  - Repeat CBC at 37 weeks  - Test for GBS at next prenatal visit  - Follow up in 1 weeks      Orders Placed This Encounter    AMB POC URINALYSIS DIP STICK AUTO W/O MICRO       Labor precautions discussed, including: Regular painful contractions, lasting for greater than one hour, taking your breath away; any vaginal bleeding; any leakage of fluid; or absent or decreased fetal movement. Call M.D. on call if any of these symptoms or signs occur. I have discussed the diagnosis with the patient and the intended plan as seen in the above orders. The patient has received an after-visit summary and questions were answered concerning future plans. I have discussed medication side effects and warnings with the patient as well.     Patient discussed with Dr. Kalpesh Lam MD  Family Medicine Resident

## 2017-09-14 ENCOUNTER — HOSPITAL ENCOUNTER (INPATIENT)
Age: 32
LOS: 2 days | Discharge: HOME | End: 2017-09-16
Attending: FAMILY MEDICINE | Admitting: FAMILY MEDICINE
Payer: SUBSIDIZED

## 2017-09-14 PROBLEM — Z34.90 PREGNANCY: Status: ACTIVE | Noted: 2017-09-14

## 2017-09-14 LAB
BASOPHILS # BLD: 0 K/UL (ref 0–0.1)
BASOPHILS NFR BLD: 0 % (ref 0–1)
DAILY QC (YES/NO)?: YES
EOSINOPHIL # BLD: 0 K/UL (ref 0–0.4)
EOSINOPHIL NFR BLD: 0 % (ref 0–7)
ERYTHROCYTE [DISTWIDTH] IN BLOOD BY AUTOMATED COUNT: 14.3 % (ref 11.5–14.5)
HCT VFR BLD AUTO: 37.9 % (ref 35–47)
HGB BLD-MCNC: 12.8 G/DL (ref 11.5–16)
LYMPHOCYTES # BLD: 1.7 K/UL (ref 0.8–3.5)
LYMPHOCYTES NFR BLD: 19 % (ref 12–49)
MCH RBC QN AUTO: 29.8 PG (ref 26–34)
MCHC RBC AUTO-ENTMCNC: 33.8 G/DL (ref 30–36.5)
MCV RBC AUTO: 88.1 FL (ref 80–99)
MONOCYTES # BLD: 0.6 K/UL (ref 0–1)
MONOCYTES NFR BLD: 6 % (ref 5–13)
NEUTS SEG # BLD: 6.8 K/UL (ref 1.8–8)
NEUTS SEG NFR BLD: 75 % (ref 32–75)
PH, VAGINAL FLUID: 6.5 (ref 5–6.1)
PLATELET # BLD AUTO: 242 K/UL (ref 150–400)
RBC # BLD AUTO: 4.3 M/UL (ref 3.8–5.2)
WBC # BLD AUTO: 9.2 K/UL (ref 3.6–11)

## 2017-09-14 PROCEDURE — 74011250636 HC RX REV CODE- 250/636: Performed by: FAMILY MEDICINE

## 2017-09-14 PROCEDURE — 75410000000 HC DELIVERY VAGINAL/SINGLE: Performed by: FAMILY MEDICINE

## 2017-09-14 PROCEDURE — 75410000002 HC LABOR FEE PER 1 HR: Performed by: FAMILY MEDICINE

## 2017-09-14 PROCEDURE — 99283 EMERGENCY DEPT VISIT LOW MDM: CPT | Performed by: FAMILY MEDICINE

## 2017-09-14 PROCEDURE — 85025 COMPLETE CBC W/AUTO DIFF WBC: CPT | Performed by: FAMILY MEDICINE

## 2017-09-14 PROCEDURE — 36415 COLL VENOUS BLD VENIPUNCTURE: CPT | Performed by: FAMILY MEDICINE

## 2017-09-14 PROCEDURE — 74011000250 HC RX REV CODE- 250

## 2017-09-14 PROCEDURE — 76815 OB US LIMITED FETUS(S): CPT | Performed by: FAMILY MEDICINE

## 2017-09-14 PROCEDURE — 74011250637 HC RX REV CODE- 250/637: Performed by: FAMILY MEDICINE

## 2017-09-14 PROCEDURE — 65270000029 HC RM PRIVATE

## 2017-09-14 PROCEDURE — 83986 ASSAY PH BODY FLUID NOS: CPT | Performed by: FAMILY MEDICINE

## 2017-09-14 PROCEDURE — 75410000003 HC RECOV DEL/VAG/CSECN EA 0.5 HR: Performed by: FAMILY MEDICINE

## 2017-09-14 PROCEDURE — 74011000258 HC RX REV CODE- 258

## 2017-09-14 PROCEDURE — 74011250636 HC RX REV CODE- 250/636

## 2017-09-14 PROCEDURE — 59025 FETAL NON-STRESS TEST: CPT | Performed by: FAMILY MEDICINE

## 2017-09-14 RX ORDER — SWAB
1 SWAB, NON-MEDICATED MISCELLANEOUS DAILY
Status: DISCONTINUED | OUTPATIENT
Start: 2017-09-15 | End: 2017-09-16 | Stop reason: HOSPADM

## 2017-09-14 RX ORDER — PENICILLIN G POTASSIUM 5000000 [IU]/1
INJECTION, POWDER, FOR SOLUTION INTRAMUSCULAR; INTRAVENOUS
Status: COMPLETED
Start: 2017-09-14 | End: 2017-09-14

## 2017-09-14 RX ORDER — SODIUM CHLORIDE 900 MG/100ML
INJECTION INTRAVENOUS
Status: COMPLETED
Start: 2017-09-14 | End: 2017-09-14

## 2017-09-14 RX ORDER — HYDROCORTISONE ACETATE PRAMOXINE HCL 2.5; 1 G/100G; G/100G
CREAM TOPICAL AS NEEDED
Status: DISCONTINUED | OUTPATIENT
Start: 2017-09-14 | End: 2017-09-16 | Stop reason: HOSPADM

## 2017-09-14 RX ORDER — OXYTOCIN IN 5 % DEXTROSE 30/500 ML
PLASTIC BAG, INJECTION (ML) INTRAVENOUS
Status: DISCONTINUED
Start: 2017-09-14 | End: 2017-09-14

## 2017-09-14 RX ORDER — SODIUM CHLORIDE 0.9 % (FLUSH) 0.9 %
5-10 SYRINGE (ML) INJECTION AS NEEDED
Status: DISCONTINUED | OUTPATIENT
Start: 2017-09-14 | End: 2017-09-16 | Stop reason: HOSPADM

## 2017-09-14 RX ORDER — ZOLPIDEM TARTRATE 5 MG/1
5 TABLET ORAL
Status: DISCONTINUED | OUTPATIENT
Start: 2017-09-14 | End: 2017-09-16 | Stop reason: HOSPADM

## 2017-09-14 RX ORDER — ONDANSETRON 2 MG/ML
4 INJECTION INTRAMUSCULAR; INTRAVENOUS
Status: DISCONTINUED | OUTPATIENT
Start: 2017-09-14 | End: 2017-09-16 | Stop reason: HOSPADM

## 2017-09-14 RX ORDER — IBUPROFEN 800 MG/1
800 TABLET ORAL
Status: DISCONTINUED | OUTPATIENT
Start: 2017-09-14 | End: 2017-09-14

## 2017-09-14 RX ORDER — LIDOCAINE HYDROCHLORIDE 10 MG/ML
10 INJECTION, SOLUTION EPIDURAL; INFILTRATION; INTRACAUDAL; PERINEURAL ONCE
Status: DISCONTINUED | OUTPATIENT
Start: 2017-09-14 | End: 2017-09-14

## 2017-09-14 RX ORDER — NALOXONE HYDROCHLORIDE 0.4 MG/ML
0.4 INJECTION, SOLUTION INTRAMUSCULAR; INTRAVENOUS; SUBCUTANEOUS AS NEEDED
Status: DISCONTINUED | OUTPATIENT
Start: 2017-09-14 | End: 2017-09-16 | Stop reason: HOSPADM

## 2017-09-14 RX ORDER — OXYCODONE AND ACETAMINOPHEN 5; 325 MG/1; MG/1
1 TABLET ORAL
Status: DISCONTINUED | OUTPATIENT
Start: 2017-09-14 | End: 2017-09-16 | Stop reason: HOSPADM

## 2017-09-14 RX ORDER — OXYTOCIN IN 5 % DEXTROSE 30/500 ML
500 PLASTIC BAG, INJECTION (ML) INTRAVENOUS ONCE
Status: DISCONTINUED | OUTPATIENT
Start: 2017-09-14 | End: 2017-09-14

## 2017-09-14 RX ORDER — OXYTOCIN/RINGER'S LACTATE 20/1000 ML
125-500 PLASTIC BAG, INJECTION (ML) INTRAVENOUS ONCE
Status: DISCONTINUED | OUTPATIENT
Start: 2017-09-14 | End: 2017-09-14

## 2017-09-14 RX ORDER — LIDOCAINE HYDROCHLORIDE 10 MG/ML
INJECTION INFILTRATION; PERINEURAL
Status: DISCONTINUED
Start: 2017-09-14 | End: 2017-09-14

## 2017-09-14 RX ORDER — SODIUM CHLORIDE, SODIUM LACTATE, POTASSIUM CHLORIDE, CALCIUM CHLORIDE 600; 310; 30; 20 MG/100ML; MG/100ML; MG/100ML; MG/100ML
125 INJECTION, SOLUTION INTRAVENOUS CONTINUOUS
Status: DISCONTINUED | OUTPATIENT
Start: 2017-09-14 | End: 2017-09-16 | Stop reason: HOSPADM

## 2017-09-14 RX ORDER — IBUPROFEN 800 MG/1
800 TABLET ORAL
Status: DISCONTINUED | OUTPATIENT
Start: 2017-09-14 | End: 2017-09-16 | Stop reason: HOSPADM

## 2017-09-14 RX ORDER — SODIUM CHLORIDE 0.9 % (FLUSH) 0.9 %
5-10 SYRINGE (ML) INJECTION EVERY 8 HOURS
Status: DISCONTINUED | OUTPATIENT
Start: 2017-09-14 | End: 2017-09-16 | Stop reason: HOSPADM

## 2017-09-14 RX ADMIN — OXYCODONE AND ACETAMINOPHEN 1 TABLET: 5; 325 TABLET ORAL at 21:01

## 2017-09-14 RX ADMIN — IBUPROFEN 800 MG: 800 TABLET, FILM COATED ORAL at 13:09

## 2017-09-14 RX ADMIN — OXYCODONE AND ACETAMINOPHEN 1 TABLET: 5; 325 TABLET ORAL at 13:09

## 2017-09-14 RX ADMIN — ONDANSETRON 4 MG: 2 INJECTION INTRAMUSCULAR; INTRAVENOUS at 11:06

## 2017-09-14 RX ADMIN — SODIUM CHLORIDE, SODIUM LACTATE, POTASSIUM CHLORIDE, AND CALCIUM CHLORIDE 125 ML/HR: 600; 310; 30; 20 INJECTION, SOLUTION INTRAVENOUS at 09:28

## 2017-09-14 RX ADMIN — IBUPROFEN 800 MG: 800 TABLET, FILM COATED ORAL at 18:38

## 2017-09-14 RX ADMIN — SODIUM CHLORIDE 100 ML: 900 INJECTION, SOLUTION INTRAVENOUS at 09:26

## 2017-09-14 RX ADMIN — PENICILLIN G POTASSIUM 5 MILLION UNITS: 5000000 POWDER, FOR SOLUTION INTRAMUSCULAR; INTRAPLEURAL; INTRATHECAL; INTRAVENOUS at 09:26

## 2017-09-14 NOTE — PROGRESS NOTES
Bedside and Verbal shift change report given to GUSTAVO Owens (oncoming nurse) by TINA Crook RN (offgoing nurse). Report included the following information SBAR, Kardex and MAR.

## 2017-09-14 NOTE — LACTATION NOTE
Problem: Lactation Care Plan  Goal: *Infant latching appropriately  Outcome: Progressing Towards Goal  Mom plans to do both breast and formula. Discussed importance of colostrum and supply and demand.      Pt will successfully establish breastfeeding by feeding in response to early feeding cues   or wake every 3h, will obtain deep latch, and will keep log of feedings/output. Taught to BF at hunger cues and or q 2-3 hrs and to offer 10-20 drops of hand expressed colostrum at any non-feeds.        Breast Assessment  Left Breast: Medium  Left Nipple: Everted, Intact  Right Breast: Medium  Right Nipple: Everted, Intact  Breast- Feeding Assessment  Attends Breast-Feeding Classes: No  Breast-Feeding Experience:  (1 1/2 year with first)  Breast Trauma/Surgery: No  Type/Quality: Attempted  Lactation Consultant Visits  Breast-Feedings: Attempted breast-feeding  Mother/Infant Observation  Mother Observation: Alignment, Breast comfortable, Close hold, Holds breast  Infant Observation: Latches nipple and aereolae, Lips flanged, lower, Lips flanged, upper, Opens mouth (with nipple shield)  LATCH Documentation  Latch: Repeated attempts, hold nipple in mouth, stimulate to suck (used nipple shield due to gest age. )  Audible Swallowing: A few with stimulation  Type of Nipple: Everted (after stimulation)  Comfort (Breast/Nipple): Soft/non-tender  Hold (Positioning): Full assist, teach one side, mother does other, staff holds  LATCH Score: 7          Goal: *Weight loss less than 10% of birth weight  Outcome: Progressing Towards Goal      Encouraged mom to attempt feeding with baby led feeding cues. Just as sucking on fingers, rooting, mouthing. Looking for 8-12 feedings in 24 hours. Don't limit baby at breast, allow baby to come of breast on it's own. Baby may want to feed  often and may increase number of feedings on second day of life.  Skin to skin encouraged.        If baby doesn't nurse,  Mom should  hand express  10-20 drops of colostrum and drip into baby's mouth, or give to baby by finger feeding, cup feeding, or spoon feeding at least every 2-3 hours.      Problem: Patient Education: Go to Patient Education Activity  Goal: Patient/Family Education  Outcome: Progressing Towards Goal  Reviewed breastfeeding basics:  Supply and demand,  stomach size, early  Feeding cues, skin to skin, positioning and baby led latch-on, assymetrical latch with signs of good, deep latch vs shallow, feeding frequency and duration, and log sheet for tracking infant feedings and output. Breastfeeding Booklet and Warm line information given. Discussed typical  weight loss and the importance of infant weight checks with pediatrician 1-2 post discharge.      Hand Expression Education:  Mom taught how to manually hand express her colostrum. Emphasized the importance of providing infant with valuable colostrum as infant rests skin to skin at breast.  Aware to avoid extended periods of non-feeding. Aware to offer 10-20+ drops of colostrum every 2-3 hours until infant is latching and nursing effectively. Taught the rationale behind this low tech but highly effective evidence based practice.      Discussed with mother her plan for feeding. Reviewed the benefits of exclusive breast milk feeding during the hospital stay. Informed her of the risks of using formula to supplement in the first few days of life as well as the benefits of successful breast milk feeding; referred her to the Breastfeeding booklet about this information. She acknowledges understanding of information reviewed and states that it is her plan to both breast and formula feed her infant.   Will support her choice and offer additional information as needed.

## 2017-09-14 NOTE — IP AVS SNAPSHOT
Yani Kebede 
 
 
 [address] 
[phone] Patient: Patsy Calderón MRN: [id number] :1985 You are allergic to the following No active allergies Recent Documentation Height Weight Breastfeeding? BMI OB Status Smoking Status 1.499 m 78.9 kg Unknown 35.14 kg/m2 Recent pregnancy Never Smoker Unresulted Labs Order Current Status SAMPLE TO BLOOD BANK In process PH BY NITRAZINE, POC Preliminary result Emergency Contacts Name Discharge Info Relation Home Work Mobile Jv Malcolm DISCHARGE CAREGIVER [3] Spouse [3] [phone] Lynn Streeter  Other Relative [6] [phone] About your hospitalization You were admitted on:  2017 You last received care in the:  [facility] 3 MOTHER INFANT You were discharged on:  2017 Unit phone number:  [phone] Why you were hospitalized Your primary diagnosis was:  Not on File Your diagnoses also included:  Rupture Of Membranes With Clear Amniotic Fluid, Pregnancy Providers Seen During Your Hospitalizations Provider Role Specialty Primary office phone Betty Frausto MD Attending Provider [facility] [phone] Your Primary Care Physician (PCP) Primary Care Physician Office Phone Office Fax [facility], [address] [place] [address] [phone] Follow-up Information Follow up With Details Comments Contact Info Hever Coreas MD In 6 weeks  Juli Dougherty [phone] [facility] 
[phone] Current Discharge Medication List  
  
START taking these medications Dose & Instructions Dispensing Information Comments Morning Noon Evening Bedtime  
 docusate sodium 100 mg capsule Commonly known as:  Kavita Buck Your last dose was:     
   
Your next dose is:    
   
   
 Dose:  100 mg  
 Take 1 Cap by mouth two (2) times a day for 7 days. Quantity:  14 Cap Refills:  0  
     
   
   
   
  
 ibuprofen 800 mg tablet Commonly known as:  MOTRIN Your last dose was: Your next dose is:    
   
   
 Dose:  800 mg Take 1 Tab by mouth every six (6) hours as needed. Quantity:  30 Tab Refills:  0 CONTINUE these medications which have NOT CHANGED Dose & Instructions Dispensing Information Comments Morning Noon Evening Bedtime PRENATAL VITAMIN PO Your last dose was: Your next dose is: Take  by mouth. Refills:  0 Where to Get Your Medications Information on where to get these meds will be given to you by the nurse or doctor. ! Ask your nurse or doctor about these medications  
  docusate sodium 100 mg capsule  
 ibuprofen 800 mg tablet Discharge Instructions Alimentación de lan recién nacido: Instrucciones de cuidado - [ Feeding Your Keyes: Care Instructions ] Instrucciones de cuidado Cristiana Boers a un recién nacido es harsh cuestión importante para los [place]. Los expertos recomiendan que los recién nacidos sj alimentados cuando lo pidan. [place] significa amamantar o darle biberón a lan bebé cuando muestre señales de hambre, en lugar de establecer un horario estricto. Los recién nacidos responden a mike sensaciones de [place]. Comen cuando tienen hambre y luis enrique de comer cuando están llenos. La mayoría de los expertos también recomiendan amamantar zay al menos el primer año. Harsh preocupación común para los padres es si lan bebé está comiendo lo suficiente. Hable con lan médico si está preocupada por cuánto está comiendo lan bebé. La mayoría de los recién nacidos aj Heath [employer] primeros días después del nacimiento, Waylon lo recuperan en harsh [facility]. Después de las  [facility], lan bebé debe continuar aumentando de peso de forma anahy. La atención de seguimiento es harsh parte clave del tratamiento y la seguridad de lan hijo. Asegúrese de hacer y acudir a todas las citas, y llame a lan médico si lan hijo está teniendo problemas. También es harsh buena idea saber los resultados de los exámenes de lan hijo y mantener harsh lista de los medicamentos que robin. Cómo puede cuidar a lan hijo en el hogar? · Permita a lan bebé que se alimente cuando lo pida. [place] 172 primeras 2 semanas, estas thom tienen lugar cada 1 a 3 horas (alrededor de 8 a 12 veces en un período de 24 horas) para los bebés [employer]. Estas primeras sesiones de amamantamiento pueden durar sólo unos minutos. Con el tiempo, las sesiones se irán haciendo más largas y podrían tener lugar con menos frecuencia. ¨ Es posible que los bebés que se alimentan con leche de fórmula necesiten hacerlo con harsh frecuencia un poco halina, aproximadamente entre 6 y 10 veces cada 24 horas. [place]erán de 2 a 3 onzas (60 a 90 ml) cada 3 a 4 horas zay las primeras semanas de bethany. ¨ A los 2 meses, la mayoría de los bebés tienen harsh rutina de alimentación establecida. Elvin a veces la rutina de lan bebé puede cambiar, [place], por [place], zay los períodos de crecimiento acelerado cuando lan bebé podría tener hambre más a menudo. · Es posible que deba despertar a lan bebé para alimentarle zay los primeros días posteriores al nacimiento. · No le dé ningún otro tipo de [employer] no sea [address] o de fórmula hasta que lan bebé cumpla 1 año de [place]. La leche de [place], la [place] de cabra y la leche de soya no tienen los nutrientes que necesitan los niños muy pequeños para crecer y desarrollarse adecuadamente. Meaghan Aldridge de kathy y de Shani son muy difíciles de digerir para los bebés pequeños. · Pregúntele a lan médico acerca de darle un suplemento de vitamina D a partir de los primeros días después del nacimiento.  
· Si decide que lan bebé pase del amamantamiento a la alimentación con biberón, pruebe estas sugerencias. ¨ Pruebe que mala de un biberón. Poco a poco reduzca el número de veces que le amamanta cada día. Torsten harsh semana, sustituya un amamantamiento por alimentación con biberón en piter de mike períodos de alimentación diaria. ¨ Cada semana, elija otra sesión de amamantamiento para sustituir o para reducir. ¨ Ofrézcale el biberón antes de cada amamantamiento. Cuándo debe pedir ayuda? Preste especial atención a los [employer] emiliano de lan hijo y asegúrese de comunicarse con lan médico si: · Tiene preguntas acerca de la alimentación de lan bebé. · Está preocupada de que lan bebé no esté comiendo lo suficiente. · Tiene problemas para alimentar a lan bebé. Dónde puede encontrar más información en inglés? Elder Slimmer a http://brittani-winston.info/. Mandy Records J020 en la búsqueda para aprender más acerca de \"Alimentación de lan recién nacido: Instrucciones de cuidado - [ Feeding Your : Care Instructions ]. \" 
Revisado: 2016 Versión del contenido: 11.3 © 1423-8386 Healthwise, Incorporated. Las instrucciones de cuidado fueron adaptadas bajo licencia por Good Help Connections (which disclaims liability or warranty for this information). Si usted tiene [place] afección médica o sobre estas instrucciones, siempre pregunte a lan profesional de emiliano. Healthwise, Incorporated niega toda garantía o responsabilidad por lan uso de esta información. Después del parto (período de posparto): Instrucciones de cuidado - [ After Your Delivery (the Postpartum Period): Care Instructions ] Instrucciones de cuidado Felicidades por el nacimiento de lan bebé. Al igual que el Tonia, el tiempo con el recién nacido puede ser un momento de [place], Scotrun Ziegler y agotamiento. Es posible que se sienta braswell al mirar la wallace de lan pequeño bebé. También podría sentirse abrumada por lan nuevo ritmo de sueño y las nuevas responsabilidades. Al principio, los bebés suelen dormir zay el día y permanecen despiertos zay la noche. No tienen ningún patrón ni rutina. Podrían karli gritos ahogados, sacudirse y despertarse, o parecer tod si tuvieran los ojos cruzados (bizcos). Todo esto es normal, e incluso la pueden hacer sonreír. Zay las primeras semanas siguientes al parto, trate de cuidarse john. Podría tardar de 4 a 6 semanas en volver a sentirse usted misma, y posiblemente más tiempo si le kenyon hecho harsh cesárea. Es probable que se sienta muy fatigada zay varias semanas. Krysten días estarán llenos de Elaine, armen también de mucha alegría. La atención de seguimiento es harsh parte clave de lan tratamiento y seguridad. Asegúrese de hacer y acudir a todas las citas, y llame a lan médico si está teniendo problemas. También es harsh buena idea saber los resultados de los exámenes y mantener harsh lista de los medicamentos que robin. Cómo puede cuidarse en el hogar? Cuide lan cuerpo después del parto · Utilice toallas sanitarias en vez de tampones para las pérdidas de gayatri que podrían durar hasta 2 semanas. · Alivie los cólicos con ibuprofeno (Advil, Motrin). · Alivie el dolor de las hemorroides y la monster entre la vagina y el recto con compresas de hielo o de infusión de hamamelis [place] (\"witch hazel\"). · Alivie el estreñimiento bebiendo abundante líquido y comiendo alimentos ricos en fibra. Pregúntele a lan médico acerca de los ablandadores de heces de [employer]. · Límpiese con un chorrito suave de agua tibia de harsh botella en vez de hacerlo con papel higiénico. 
· Trimble un baño de asiento en agua tibia varias veces al día. · Use un buen sostén de lactancia. Alivie el dolor y la hinchazón de los senos con toallitas de aseo húmedas tibias. · Si no está amamantando, utilice hielo en lugar de calor para aliviar el dolor de los senos.  
· Si está amamantando, lan período menstrual podría no comenzar [employer] después de varios meses. Es posible que Reinaldo, y más tiempo al principio, de tod lo hacía antes del [facility]. · Espere hasta que haya sanado (de 4 a 6 semanas) antes de volver a tener relaciones sexuales. Lan médico le dirá cuándo puede tener relaciones sexuales. · Trate de no viajar con el bebé zay las primeras 5 o 6 semanas. Si hace un viaje bandar en automóvil, jose paradas frecuentes para caminar y estirarse. Evite el agotamiento · Descanse todos los días. Trate de dormir la siesta cuando lan bebé también lo hace. · Pídale a otro adulto que la acompañe por unos adonis después del [place]. · Planifique el cuidado de los niños si tiene otros hijos. · Sea flexible para que pueda comer a horas fuera de lo común y dormir cuando lo necesite. Tanto usted tod lan bebé están creando horarios nuevos. · Planee pequeñas salidas para estar afuera de la casa. El cambio podría hacer que se sienta menos fatigada. · Pida ayuda para cocinar y [address] hogar y las compras. Recuerde que lan principal tarea consiste en cuidar a lan bebé. Conozca la ayuda que puede recibir en willard de tener depresión posparto · La depresión posparto es común zay las primeras 1 a 2 semanas siguientes al nacimiento. Podría llorar o sentirse diego o irritable sin razón alguna. · Descanse cada vez que pueda hacerlo. Estar fatigada le dificulta manejar mike emociones. · Salga a caminar con lan bebé. · Hable con lan alondra, mike amigos y mike familiares acerca de mike sentimientos. · Si mike síntomas schumacher más de unas pocas semanas, o si se siente muy deprimida, pídale ayuda a lan médico. 
· La depresión posparto puede tratarse. Los grupos de apoyo y la asesoría psicológica pueden ser de Benedict Rosario. A veces, los medicamentos también pueden ayudar. Manténgase saludable · Coma alimentos sanos para tener más energía, producir harsh buena [place] materna y adelgazar las libras adicionales que engordó con el bebé. · Si amamanta, evite el alcohol y las drogas. No fume. Si dejó de fumar zay el embarazo, felicitaciones. · Inicie ejercicios diarios después de 4 a 6 semanas, armen descanse cuando se sienta fatigada. · Aprenda ejercicios para tonificar el abdomen. Thu los ejercicios de Kegel para recuperar la fuerza de los músculos pélvicos. Puede hacer los ejercicios de Kegel mientras está de pie o sentada. ¨ Apriete los mismos músculos que usted usaría para detener la [place]. El abdomen y los muslos no deben moverse. ¨ Manténgalos apretados zay 3 segundos y, luego, relájelos otros 3 segundos. ¨ Empiece con 3 segundos. [place], añada 1 julia cada semana hasta que sea capaz de apretar zay 10 segundos. ¨ Repita el ejercicio entre 10 y 13 veces cada sesión. Thu eduardo o más sesiones cada día. · Busque harsh clase para nuevas madres y recién nacidos que tenga un tiempo de ejercicio. · Si le kenyon hecho harsh cesárea, dese un poco más de tiempo antes de hacer ejercicio, y tenga cuidado. Cuándo debe pedir ayuda? Llame al 911 en cualquier momento que considere que necesita atención de [place]. Por ejemplo, llame si: 
· Se desmayó (perdió el conocimiento). Llame a charles médico ahora mismo o busque atención médica inmediata si: · Tiene sangrado vaginal intenso. [place] significa que está expulsando coágulos sanguíneos y empapando harsh toalla sanitaria cada hora por 2 horas o más. · Está mareada o aturdida, o siente tod que se puede [address] [facility] 15. · Tiene fiebre. · Tiene dolor abdominal nuevo o que empeora. Preste especial atención a los cambios en charles emiliano y asegúrese de comunicarse con charles médico si: 
· Charles sangrado vaginal parece volverse más intenso. · Tiene flujo vaginal nuevo o que empeora. · Se siente diego, ansiosa o sin esperanzas zay más de unos pocos días. · No mejora tod se esperaba. Dónde puede encontrar más información en inglés? Bibi Damon a http://brittani-winston.info/. Kaia Kilgore C720 en la búsqueda para aprender más acerca de \"Después del parto (período de posparto): Instrucciones de cuidado - [ After Your Delivery (the Postpartum Period): Care Instructions ]. \" 
Revisado: 16 marzo, 2017 Versión del contenido: 11.3 © 0531-0849 Healthwise, Incorporated. Las instrucciones de cuidado fueron adaptadas bajo licencia por Good Help Connections (which disclaims liability or warranty for this information). Si usted tiene [place] [address] afección médica o sobre estas instrucciones, siempre pregunte a lan profesional de emiliano. Healthwise, Incorporated niega toda garantía o responsabilidad por lan uso de esta información. Discharge Instructions Attachments/References POSTPARTUM CARE (Greenlandic) Discharge Orders None Maintenance Assistant Announcement We are excited to announce that we are making your provider's discharge notes available to you in Maintenance Assistant. You will see these notes when they are completed and signed by the physician that discharged you from your recent hospital stay. If you have any questions or concerns about any information you see in Maintenance Assistant, please call the Health Information Department where you were seen or reach out to your Primary Care Provider for more information about your plan of care. Introducing \[facility] [place] [facility]\"" HEALTH SERVICES! [facility] introduce portal paciente Maintenance Assistant . Ahora se puede acceder a partes de lan expediente médico, enviar por correo electrónico la oficina de lan médico y solicitar renovaciones de medicamentos en línea. En lan navegador de Internet , Puma Russell a [email or web address] [email or web address] Thu michael en el usuario por Andrew Amos? Victor Manuel rodriguez aquí en la sesión Pauline Gomez. Verá la página de registro [place]. Ingrese lan código de Bank  Jennifer tram y tod aparece a continuación. Uslucy no tendrá que [employer] código después de clive completado el proceso de registro .  Si usted no se inscribe antes de la fecha de caducidad , debe solicitar un nuevo código. · MyChart Código de acceso : [id number] Expires: 10/9/2017  9:44 AM 
 
Ingresa los últimos cuatro dígitos de lan Número de Seguro Social ( xxxx ) y fecha de nacimiento ( dd / mm / aaaa ) tod se indica y thu clic en Enviar. Usted será llevado a la siguiente página de registro . Crear un ID MyChart . Esta será lan ID de inicio de sesión de MyChart y no puede ser [place] , por lo que pensar en harsh que es Rheta Pointer y fácil de recordar . Crear harsh contraseña MyChart . Usted puede cambiar lan contraseña en cualquier momento . Ingrese lan Password Reset de preguntas y Garcia . Alameda se puede utilizar en un momento posterior si usted olvida lan contraseña. Introduzca lan dirección de correo electrónico . Gayathri Mueller recibirá harsh notificación por correo electrónico cuando la nueva información está disponible en MyChart . [place] Bue clic en Registrarse. Delmi Fought jodi y descargar porciones de lan expediente médico. 
Thu clic en el enlace de descarga del menú Resumen para descargar harsh copia portátil de lan información médica . Si tiene [employer] , por favor visite la sección de preguntas frecuentes del sitio web MyChart . Recuerde, MyChart NO es que se utilizará para las necesidades urgentes. Para emergencias médicas , llame al 911 . Ahora disponible en aln iPhone y Android ! General Information Please provide this summary of care documentation to your next provider. Patient Signature:  ____________________________________________________________ Date:  ____________________________________________________________  
  
Lisset Nanda Provider Signature:  ____________________________________________________________ Date:  ____________________________________________________________ More Information Postpartum: Care Instructions Your Care Instructions After childbirth (postpartum period), your body goes through many changes. Some of these changes happen over several weeks. In the hours after delivery, your body will begin to recover from childbirth while it prepares to breastfeed your . You may feel emotional during this time. Your hormones can shift your mood without warning for no clear reason. In the first couple of weeks after childbirth, many women have emotions that change from happy to sad. You may find it hard to sleep. You may cry a lot. This is called the \"baby blues. \" These overwhelming emotions often go away within a couple of days or weeks. But it's important to discuss your feelings with your doctor. It is easy to get too tired and overwhelmed during the first weeks after childbirth. Don't try to do too much. Get rest whenever you can, accept help from others, and eat well and drink plenty of fluids. About 4 to 6 weeks after your baby's birth, you will have a follow-up visit with your doctor. This visit is your time to talk to your doctor about anything you are concerned or curious about. Follow-up care is a key part of your treatment and safety. Be sure to make and go to all appointments, and call your doctor if you are having problems. It's also a good idea to know your test results and keep a list of the medicines you take. How can you care for yourself at home? · Sleep or rest when your baby sleeps. · Get help with household chores from family or friends, if you can. Do not try to do it all yourself. · If you have hemorrhoids or swelling or pain around the opening of your vagina, try using cold and heat. You can put ice or a cold pack on the area for 10 to 20 minutes at a time. Put a thin cloth between the ice and your skin. Also try sitting in a few inches of warm water (sitz bath) 3 times a day and after bowel movements. · Take pain medicines exactly as directed. ¨ If the doctor gave you a prescription medicine for pain, take it as prescribed. ¨ If you are not taking a prescription pain medicine, ask your doctor if you can take an over-the-counter medicine. · Eat more fiber to avoid constipation. Include foods such as whole-grain breads and cereals, raw vegetables, raw and dried fruits, and beans. · Drink plenty of fluids, enough so that your urine is light yellow or clear like water. If you have kidney, heart, or liver disease and have to limit fluids, talk with your doctor before you increase the amount of fluids you drink. · Do not rinse inside your vagina with fluids (douche). · If you have stitches, keep the area clean by pouring or spraying warm water over the area outside your vagina and anus after you use the toilet. · Keep a list of questions to bring to your postpartum visit. Your questions might be about: 
¨ Changes in your breasts, such as lumps or soreness. ¨ When to expect your menstrual period to start again. ¨ What form of birth control is best for you. ¨ Weight you have put on during the pregnancy. ¨ Exercise options. ¨ What foods and drinks are best for you, especially if you are breastfeeding. ¨ Problems you might be having with breastfeeding. ¨ When you can have sex. Some women may want to talk about lubricants for the vagina. ¨ Any feelings of sadness or restlessness that you are having. When should you call for help? Call 911 anytime you think you may need emergency care. For example, call if: 
· You have thoughts of harming yourself, your baby, or another person. · You passed out (lost consciousness). Call your doctor now or seek immediate medical care if: 
· Your vaginal bleeding seems to be getting heavier. · You are dizzy or lightheaded, or you feel like you may faint. · You have a fever. Watch closely for changes in your health, and be sure to contact your doctor if: 
· You have new or worse vaginal discharge. · You feel sad or depressed. · You are having problems with your breasts or breastfeeding. Where can you learn more? Go to http://brittani-winston.info/. Enter V129 in the search box to learn more about \"Postpartum: Care Instructions. \" Current as of: 2017 Content Version: 11.3 © 5179-1535 Kriyari. Care instructions adapted under license by [employer] (which disclaims liability or warranty for this information). If you have questions about a medical condition or this instruction, always ask your healthcare professional. [place] [address] any warranty or liability for your use of this information. [address] 
 
 
 [address] 
[phone] Patient: Veena Barragan MRN: [id number] :1985 Tiene alergias a lo siguiente No tiene alergias Documentación recientes Height Weight Está amamantando? BMI [facility]) Estado obstétrico Estatus de tabaquísmo 1.499 m 78.9 kg Unknown 35.14 kg/m2 Recent pregnancy Never Smoker Unresulted Labs Order Current Status SAMPLE TO BLOOD BANK In process PH BY NITRAZINE, POC Preliminary result Emergency Contacts Name Discharge Info Relation Home Work Mobile Jv Malcolm DISCHARGE CAREGIVER [3] Spouse [3] [phone] StreeterNahida  Other Relative [6] [phone] Sobre charles hospitalización Le admitieron el:  2017 Charles tratamiento más reciente fue el:  [facility] 3 MOTHER INFANT Le dieron de kyle el:  2017 Número de teléfono de la unidad:  [phone] Por qué le ingresaron Charles diagnosis primaria es:  No está archivado/a Charles diagnosis también incluye:  Rupture Of Membranes With Clear Amniotic Fluid, Pregnancy Proveedores de verse zay mike hospitalizaciones Personal Médico Rol Especialidad Teléfono principal de la oficina Yue Thomas MD Attending Provider [facility] [phone] Charles médico de atención primaria (PCP ) Primary Care Physician Office Phone Office Fax 1010 [facility], [address] [place] [address] [phone] Follow-up Information Follow up With Details Comments Contact Info Keegan Butler MD In 6 weeks  [address] [employer] [address] 
[phone] Aprobación de la gestión actual lista de medicamentos EMPIECE a bud [employer] Dosis e instrucciones Información de dispensación Comentarios Morning Noon Evening Bedtime  
 docusate sodium 100 mg capsule También conocido tod:  Marion Montalvo Your last dose was: Your next dose is:    
   
   
 Dosis:  100 mg Take 1 Cap by mouth two (2) times a day for 7 days. cantidad:  14 Cap  
recargas:  0  
     
   
   
   
  
 ibuprofen 800 mg tablet También conocido tod:  MOTRIN Your last dose was: Your next dose is:    
   
   
 Dosis:  800 mg Take 1 Tab by mouth every six (6) hours as needed. cantidad:  30 Tab  
recargas:  0 CONTINUAR estos medicamentos que no [place] Dosis e instrucciones Información de dispensación Comentarios Morning Noon Evening Bedtime PRENATAL VITAMIN PO Your last dose was: Your next dose is: Take  by mouth. recargas:  0 Dónde puede recoger mike medicamentos Información sobre dónde obtener estos medicamentos se le dará a usted por la enfermera o el médico.   
 ! Pregunte a charles médico o enfermero/a sobre estos medicamentos  
  docusate sodium 100 mg capsule  
 ibuprofen 800 mg tablet Discharge Instructions Alimentación de charles recién nacido: Instrucciones de cuidado - [ Feeding Your : Care Instructions ] Instrucciones de cuidado Zi Noxen a un recién nacido es harsh cuestión importante para los Carlota. Los expertos recomiendan que los recién nacidos sj alimentados cuando lo pidan. Fisher significa amamantar o darle biberón a lan bebé cuando muestre señales de hambre, en lugar de establecer un horario estricto. Los recién nacidos responden a mike sensaciones de [place]. Comen cuando tienen hambre y luis enrique de comer cuando están llenos. La mayoría de los expertos también recomiendan amamantar zay al menos el primer año. Harsh preocupación común para los padres es si lan bebé está comiendo lo suficiente. Hable con lan médico si está preocupada por cuánto está comiendo lan bebé. La mayoría de los recién nacidos patsyMusic [place] primeros días después del nacimiento, Waylon lo recuperan en harsh [facility]. Después de las 2 11 [facility], lan bebé debe continuar aumentando de peso de forma anahy. La atención de seguimiento es harsh parte clave del tratamiento y la seguridad de lan hijo. Asegúrese de hacer y acudir a todas las citas, y llame a lan médico si lan hijo está teniendo problemas. También es harsh buena idea saber los resultados de los exámenes de lan hijo y mantener harsh lista de los medicamentos que robin. Cómo puede cuidar a lan hijo en el [facility]? · Permita a lan bebé que se alimente cuando lo pida. [place] 172 primeras 2 semanas, estas thom tienen lugar cada 1 a 3 horas (alrededor de 8 a 12 veces en un período de 24 horas) para los bebés [employer]. Estas primeras sesiones de amamantamiento pueden durar sólo unos minutos. Con el tiempo, las sesiones se irán haciendo más largas y podrían tener lugar con menos frecuencia. ¨ Es posible que los bebés que se alimentan con leche de fórmula necesiten hacerlo con harsh frecuencia un poco halina, aproximadamente entre 6 y 10 veces cada 24 horas. Comerán de 2 a 3 onzas (60 a 90 ml) cada 3 a 4 horas zay las primeras semanas de bethany. ¨ A los 2 meses, la mayoría de los bebés tienen harsh rutina de alimentación establecida. Elvin a veces la rutina de lan bebé puede cambiar, [place], por [place], zay los períodos de crecimiento acelerado cuando lan bebé podría tener hambre más a menudo. · Es posible que deba despertar a lan bebé para alimentarle zay los primeros días posteriores al nacimiento. · No le dé ningún otro tipo de [place] no sea [address] o de fórmula hasta que lan bebé cumpla 1 año de [place]. La leche de [place], la [place] de cabra y la leche de soya no tienen los nutrientes que necesitan los niños muy pequeños para crecer y desarrollarse adecuadamente. Raynald Punter de kathy y de [place] son muy difíciles de digerir para los bebés pequeños. · Pregúntele a lan médico acerca de darle un suplemento de vitamina D a partir de los primeros días después del nacimiento. · Si decide que lan bebé pase del amamantamiento a la alimentación con biberón, pruebe estas sugerencias. ¨ Pruebe que mala de un biberón. Poco a poco reduzca el número de veces que le amamanta cada día. Zay harsh semana, sustituya un amamantamiento por alimentación con biberón en piter de mike períodos de alimentación diaria. ¨ Cada semana, elija otra sesión de amamantamiento para sustituir o para reducir. ¨ Ofrézcale el biberón antes de cada amamantamiento. Cuándo debe pedir ayuda? Preste especial atención a los [employer] emiliano de lan hijo y asegúrese de comunicarse con lan médico si: · Tiene preguntas acerca de la alimentación de lan bebé. · Está preocupada de que lan bebé no esté comiendo lo suficiente. · Tiene problemas para alimentar a lan bebé. Dónde puede encontrar más información en inglés? James Palmer a [email or web address] Sandro Stapleton B040 en la búsqueda para aprender más acerca de \"Alimentación de lan recién nacido: Instrucciones de cuidado - [ Feeding Your [place]: Care Instructions ]. \" 
Revisado: 2016 Versión del contenido: 11.3 © 6057-9382 Healthwise, Incorporated. Las instrucciones de cuidado fueron adaptadas bajo licencia por Good [employer] Newsletter Connections (which disclaims liability or warranty for this information). Si usted tiene [place] afección médica o sobre estas instrucciones, siempre pregunte a lan profesional de emiliano. Healthwise, Incorporated niega toda garantía o responsabilidad por lan uso de esta información. Después del parto (período de posparto): Instrucciones de cuidado - [ After Your Delivery (the Postpartum Period): Care Instructions ] Instrucciones de cuidado Felicidades por el nacimiento de lan bebé. Al igual que el [place], el tiempo con el recién nacido puede ser un momento de [place], [place] y agotamiento. Es posible que se sienta braswell al mirar la wallace de lan pequeño bebé. También podría sentirse abrumada por lan nuevo ritmo de sueño y las nuevas responsabilidades. Al principio, los bebés suelen dormir zay el día y permanecen despiertos zay la noche. No tienen ningún patrón ni rutina. Podrían karli gritos ahogados, sacudirse y despertarse, o parecer tod si tuvieran los ojos cruzados (bizcos). Todo esto es normal, e incluso la pueden hacer sonreír. Zay las primeras semanas siguientes al parto, trate de cuidarse john. Podría tardar de 4 a 6 semanas en volver a sentirse usted misma, y posiblemente más tiempo si le kenyon hecho harsh cesárea. Es probable que se sienta muy fatigada zay varias semanas. Krysten días estarán llenos de Elaine, armen también de mucha alegría. La atención de seguimiento es harsh parte clave de lan tratamiento y seguridad. Asegúrese de hacer y acudir a todas las citas, y llame a lan médico si está teniendo problemas. También es harsh buena idea saber los resultados de los exámenes y mantener harsh lista de los medicamentos que robin. Cómo puede cuidarse en el hogar? Cuide lan cuerpo después del parto · Utilice toallas sanitarias en vez de tampones para las pérdidas de gayatri que podrían durar hasta 2 semanas. · Alivie los cólicos con ibuprofeno (Advil, Motrin). · Alivie el dolor de las hemorroides y la monster entre la vagina y el recto con compresas de hielo o de infusión de hamamelis [place] (\"witch hazel\"). · Alivie el estreñimiento bebiendo abundante líquido y comiendo alimentos ricos en fibra. Pregúntele a lan médico acerca de los ablandadores de heces de [employer]. · Límpiese con un chorrito suave de agua tibia de harsh botella en vez de hacerlo con papel higiénico. 
· [place] un baño de asiento en agua tibia varias veces al día. · Use un buen sostén de lactancia. Alivie el dolor y la hinchazón de los senos con toallitas de aseo húmedas tibias. · Si no está amamantando, utilice hielo en lugar de calor para aliviar el dolor de los senos. · Si está amamantando, lan período menstrual podría no comenzar hasta después de varios meses. Es posible que Reinaldo, y más tiempo al principio, de tod lo hacía antes del [facility]. · Espere hasta que haya sanado (de 4 a 6 semanas) antes de volver a tener relaciones sexuales. Lan médico le dirá cuándo puede tener relaciones sexuales. · Trate de no viajar con el bebé zay las primeras 5 o 6 semanas. Si hace un viaje bandar en automóvil, jose paradas frecuentes para caminar y estirarse. Evite el agotamiento · Descanse todos los días. Trate de dormir la siesta cuando lan bebé también lo hace. · Pídale a otro adulto que la acompañe por unos adonis después del [place]. · Planifique el cuidado de los niños si tiene otros hijos. · Sea flexible para que pueda comer a horas fuera de lo común y dormir cuando lo necesite. Tanto usted tod lan bebé están creando horarios nuevos. · Planee pequeñas salidas para estar afuera de la casa. El cambio podría hacer que se sienta menos fatigada. · Pida ayuda para cocinar y [address] hogar y las compras. Recuerde que lan principal tarea consiste en cuidar a lan bebé. Conozca la ayuda que puede recibir en willard de tener depresión posparto · La depresión posparto es común zay las primeras 1 a 2 semanas siguientes al nacimiento. Podría llorar o sentirse diego o irritable sin razón alguna. · Descanse cada vez que pueda hacerlo. Estar fatigada le dificulta manejar mike emociones. · Salga a caminar con lan bebé. · Hable con lan alondra, mike amigos y mike familiares acerca de mike sentimientos. · Si mike síntomas schumacher más de unas pocas semanas, o si se siente muy deprimida, pídale ayuda a lan médico. 
· La depresión posparto puede tratarse. Los grupos de apoyo y la asesoría psicológica pueden ser de [place]. A veces, los medicamentos también pueden ayudar. Manténgase saludable · Coma alimentos sanos para tener más energía, producir harsh buena Rector materna y adelgazar las libras adicionales que engordó con el bebé. · Si amamanta, evite el alcohol y las drogas. No fume. Si dejó de fumar zay el embarazo, felicitaciones. · Inicie ejercicios diarios después de 4 a 6 semanas, armen descanse cuando se sienta fatigada. · Aprenda ejercicios para tonificar el abdomen. Thu los ejercicios de Kegel para recuperar la fuerza de los músculos pélvicos. Puede hacer los ejercicios de Kegel mientras está de pie o sentada. ¨ Apriete los mismos músculos que usted usaría para detener la [place]. El abdomen y los muslos no deben moverse. ¨ Manténgalos apretados zay 3 segundos y, luego, relájelos otros 3 segundos. ¨ Empiece con 3 segundos. Roger Taj, añada 1 julia cada semana hasta que sea capaz de apretar zay 10 segundos. ¨ Repita el ejercicio entre 10 y 13 veces cada sesión. Thu eduardo o más sesiones cada día. · Busque harsh clase para nuevas madres y recién nacidos que tenga un tiempo de ejercicio. · Si le kenyon hecho harsh cesárea, dese un poco más de tiempo antes de hacer ejercicio, y tenga cuidado. Cuándo debe pedir ayuda? Llame al 911 en cualquier momento que considere que necesita atención de [place]. Por ejemplo, llame si: 
· Se desmayó (perdió el conocimiento). Llame a lan médico ahora mismo o busque atención médica inmediata si: · Tiene sangrado vaginal intenso. [place] significa que está expulsando coágulos sanguíneos y empapando harsh toalla sanitaria cada hora por 2 horas o más. · Está mareada o aturdida, o siente tod que se puede [address] [facility] 15. · Tiene fiebre. · Tiene dolor abdominal nuevo o que empeora. Preste especial atención a los cambios en lan emiliano y asegúrese de comunicarse con lan médico si: 
· Lan sangrado vaginal parece volverse más intenso. · Tiene flujo vaginal nuevo o que empeora. · Se siente diego, ansiosa o sin esperanzas zay más de unos pocos días. · No mejora tod se esperaba. Dónde puede encontrar más información en inglés? Elliott File a [email or web address] Harvey Catalan H537 en la búsqueda para aprender más acerca de \"Después del parto (período de posparto): Instrucciones de cuidado - [ After Your Delivery (the Postpartum Period): Care Instructions ]. \" 
Revisado: 16 marzo, 2017 Versión del contenido: 11.3 © 0935-1693 Healthwise, Incorporated. Las instrucciones de cuidado fueron adaptadas bajo licencia por Good Help Connections (which disclaims liability or warranty for this information). Si usted tiene [place] afección médica o sobre estas instrucciones, siempre pregunte a lan profesional de emiliano. Healthwise, Incorporated niega toda garantía o responsabilidad por lan uso de esta información. Discharge Instructions Attachments/References POSTPARTUM CARE (Chilean) Discharge Orders W4 Announcement We are excited to announce that we are making your provider's discharge notes available to you in Six Month Smilest.   You will see these notes when they are completed and signed by the physician that discharged you from your recent hospital stay. If you have any questions or concerns about any information you see in MyChart, please call the Health Information Department where you were seen or reach out to your Primary Care Provider for more information about your plan of care. Introducing [facility]! González Ozuna introduce portal paciente MyChart . Ahora se puede acceder a partes de lan expediente médico, enviar por correo electrónico la oficina de lan médico y solicitar renovaciones de medicamentos en línea. En lan navegador de Internet , Jalyn Jason a [email or web address] Jose clic en el usuario por Anabell Hogan? Franco Lennox clic aquí en la sesión Fabrizio Lint. Verá la página de registro [place]. Ingrese lan código de [facility] Jennifer tram y tod aparece a continuación. Usted no tendrá que [employer] código después de clive completado el proceso de registro . Si usted no se inscribe antes de la fecha de caducidad , debe solicitar un nuevo código. · My[place]rest Código de acceso : [id number] Expires: 10/9/2017  9:44 AM 
 
Ingresa los últimos cuatro dígitos de lan Número de Seguro Social ( xxxx ) y fecha de nacimiento ( dd / mm / aaaa ) tod se indica y jose clic en Enviar. Usted será llevado a la siguiente página de registro . Crear un ID MyChart . Esta será lan ID de inicio de sesión de MyChart y no puede ser [place] , por lo que pensar en harsh que es Birdie Glimpse y fácil de recordar . Crear harsh contraseña MyChart . Usted puede cambiar lan contraseña en cualquier momento . Ingrese lan Password Reset de preguntas y [place] . [place] se puede utilizar en un momento posterior si usted olvida lan contraseña. Introduzca lan dirección de correo electrónico . Molly Knows recibirá harsh notificación por correo electrónico cuando la nueva información está disponible en MyChart . Burnis Enrique rodriguez en Registrarse.  Tracy Fuelling jodi y descargar porciones de lan expediente Rosemary rodriguez en el enlace de descarga del menú Resumen para descargar [employer] copia portátil de lan información médica . Si tiene [employer] , por favor visite la sección de preguntas frecuentes del sitio web MyChart . Ruiz Morel NO es que se utilizará para las necesidades urgentes. Para emergencias médicas , llame al 911 . Ahora disponible en lan iPhone y Android ! General Information Please provide this summary of care documentation to your next provider. Patient Signature:  ____________________________________________________________ Date:  ____________________________________________________________  
  
Bart aBrragan Provider Signature:  ____________________________________________________________ Date:  ____________________________________________________________ More Information El período posparto: Instrucciones de cuidado - [ Postpartum: Care Instructions ] Instrucciones de cuidado Después del parto (período posparto), lan cuerpo pasa por muchos cambios. Algunos de estos cambios suceden zay varias semanas. [address], el cuerpo comienza a recuperarse y se prepara para el amamantamiento. Es posible que [address] se sienta sensible. Las hormonas pueden cambiar lan estado de ánimo sin advertencia y sin motivo aparente. Zay las primeras [address] después del parto, muchas mujeres tienen emociones que [place] de braswell a diego. Es posible que le resulte difícil dormir. Es posible que llore mucho. [place]kins se llama \"melancolía de la maternidad\". Estas emociones abrumadoras suelen desaparecer dentro de unos días o semanas. Elvin es importante hablar con lan médico acerca de mike sentimientos. Zay las primeras semanas después del [place], es fácil cansarse demasiado o sentirse [place]. No jose demasiados esfuerzos. Descanse cada vez que pueda, acepte la ayuda de los demás, coma john y mala abundantes líquidos. Entre 4 y 6 semanas después del nacimiento de lan bebé, tendrá harsh consulta de seguimiento con lan médico. Esta visita es lan oportunidad para hablar con lan médico sobre cualquier inquietud o kimberli que tenga. La atención de seguimiento es harsh parte clave de lan tratamiento y seguridad. Asegúrese de hacer y acudir a todas las citas, y llame a lan médico si está teniendo problemas. También es harsh buena idea saber los resultados de los exámenes y mantener harsh lista de los medicamentos que robin. Cómo puede cuidarse en el hogar? · [place] o descanse cuando lan bebé lo jose. · Si es posible, permita que mike familiares o mike amigos la ayuden con las tareas del hogar. No intente hacerlo todo usted fernando. · Si tiene hemorroides o hinchazón o dolor alrededor de la abertura de la vagina, pruebe aplicarse frío y calor. Puede aplicarse hielo o harsh compresa fría en la monster zay 10 a 20 minutos cada vez. Póngase un paño muniz entre el hielo y la piel. Además, trate de sentarse en algunos centímetros de agua tibia (baño de asiento) 3 veces al día y después de las evacuaciones. · [employer] analgésicos (medicamentos para el dolor) exactamente según las indicaciones. ¨ Si el médico le recetó un analgésico, tómelo según las indicaciones. ¨ Si no está tomando un analgésico recetado, pregúntele a lan médico si puede bud piter de [employer]. · Coma más fibra para evitar el estreñimiento. Incluya alimentos tod panes y cereales integrales, verduras crudas, frutas crudas y secas, y frijoles (habichuelas). · Joyce abundantes líquidos, los suficientes tod para que lan orina sea de color amarillo nichole o transparente tod el agua. Si tiene [employer], del corazón o del hígado y tiene que [place]'s líquidos, hable con lan médico antes de aumentar lan consumo. · No se lave el interior de la vagina con líquidos (lavados vaginales).  
· Si tiene puntos de sutura, mantenga la monster limpia con agua tibia, ya sea echándola o rociándola sobre la monster externa de la vagina y el ano después de usar el baño. · Thu harsh lista de preguntas y llévela a lan consulta posparto. Krysten preguntas podrían tratar sobre: 
¨ [employer] senos, tod bultos o sensibilidad. ¨ Cuándo esperar que regrese lan período menstrual. 
¨ Qué forma de anticoncepción es la más adecuada para usted. ¨ El peso que aumentó zay el [facility]. ¨ Las opciones de ejercicio. ¨ Qué alimentos y bebidas son mejores para usted, sobre todo si está amamantando. ¨ Problemas que podría tener con la lactancia. ¨ Cuándo puede [employer]. Algunas mujeres tram vez quieran hablar sobre lubricantes vaginales. ¨ Cualquier sentimiento de tristeza o inquietud que tenga. Cuándo debe pedir ayuda? Llame al 911 en cualquier momento que considere que necesita atención de [place]. Por ejemplo, llame si: · Tiene pensamientos sobre [place] daño a sí misma, a lan bebé o a otra persona. · Se desmayó (perdió el conocimiento). Llame a lan médico ahora mismo o busque atención médica inmediata si: 
· Lan sangrado vaginal parece hacerse más abundante. · Se siente mareada o aturdida, o que está a punto de [place]. · Tiene fiebre. Preste especial atención a los cambios en lan emiliano y asegúrese de comunicarse con lan médico si: · Tiene nuevo flujo vaginal o blue empeora. · Se siente diego o deprimida. · Tiene problemas con los senos o el amamantamiento. Dónde puede encontrar más información en inglés? Leveda Nations a [email or web address] Lonne Manjula G730 en la búsqueda para aprender más acerca de \"El período posparto: Instrucciones de cuidado - [ Postpartum: Care Instructions ]. \" 
Revisado: 16 marzo, 2017 Versión del contenido: 11.3 © 5547-5173 Adocia, [employer]. Las instrucciones de cuidado fueron adaptadas bajo licencia por Good Help Connections (which disclaims liability or warranty for this information).  Si usted tiene preguntas sobre harsh afección médica o sobre estas instrucciones, siempre pregunte a lan profesional de emiliano. [facility] Incorporated niega toda garantía o responsabilidad por lan uso de esta información.

## 2017-09-14 NOTE — H&P
History & Physical    Name: China Rosales MRN: [id number]  SSN: [id number]    YOB: 1985  Age: 28 y.o. Sex: female      Subjective:     Reason for Admission:  Pregnancy,  Labor and spontaneous rupture of membranes. History of Present Illness: Ms. Kemal Morocho is a 28 y.o.   female with an estimated gestational age of 32w1d with Estimated Date of Delivery: 10/17/17. Patient complains of moderate vaginal leaking of fluid for 4 hours. Pregnancy has been complicated by  labor. Patient denies abdominal pain  , chest pain, contractions, headache , nausea and vomiting, right upper quadrant pain  , shortness of breath, vaginal bleeding  and visual disturbances. OB History    Para Term  AB Living   2 1 1   1   SAB TAB Ectopic Molar Multiple Live Births        1      # Outcome Date GA Lbr Shakeel/2nd Weight Sex Delivery Anes PTL Lv   2 Current            1 Term 2012 38w0d  4.224 kg M Vag-Spont None  ALYSSA      Complications: Preeclampsia        No past medical history on file. No past surgical history on file. Social History     Occupational History    Not on file. Social History Main Topics    Smoking status: Never Smoker    Smokeless tobacco: Never Used    Alcohol use No    Drug use: No    Sexual activity: No      Family History   Problem Relation Age of Onset    GERD Mother     Parkinsonism Father        No Known Allergies  Prior to Admission medications    Medication Sig Start Date End Date Taking? Authorizing Provider   PRENATAL VIT CALC,IRON,FOLIC (PRENATAL VITAMIN PO) Take  by mouth. Yes Historical Provider        Review of Systems:  A comprehensive review of systems was negative except for that written in the History of Present Illness.      Objective:     Vitals:    Vitals:    17 0801 17 0806 17 0808   BP: 125/87  125/87   Pulse: 75  75   Resp:   18   Temp:   97.9 °F (36.6 °C)   Weight:  78.9 kg (174 lb) Height:  4' 11\" (1.499 m)       Temp (24hrs), Av.9 °F (36.6 °C), Min:97.9 °F (36.6 °C), Max:97.9 °F (36.6 °C)    BP  Min: 125/87  Max: 125/87     Physical Exam:  Patient without distress. Heart: Regular rate and rhythm, S1S2 present or without murmur or extra heart sounds  Lung: clear to auscultation throughout lung fields, no wheezes, no rales, no rhonchi and normal respiratory effort  Fundus: soft and non tender  Cervical Exam: 2/-3. Cervix was able to be expanded to 4 cm. Lower Extremities:  - Edema No     Membranes:   Premature Rupture of Membranes; Amniotic Fluid: clear fluid  Uterine Activity:  None  Fetal Heart Rate:  Baseline: 145 per minute       Lab/Data Review:  Recent Results (from the past 24 hour(s))   PH BY NITRAZINE, POC    Collection Time: 17  8:00 AM   Result Value Ref Range    pH-Nitrazine paper 6.5 (A) 5.0 - 6.1    Daily QC performed? Yes        Assessment and Plan:     Ms. Angelita Olmos is a 28 y.o.   female with an estimated gestational age of 32w1d who is here for  premature rupture of membranes. PNL: O+, G/C neg, HIV non reactive, Hep B surface AG neg, T Pallidum neg, Rubella and VZV immune. GBS unknown. 1. Start  care  2. Penicillin started for unknown GBS status  3.  Continue to monitor     Patient discussed with Dr. Laura Aguilar MD  Family Medicine Resident

## 2017-09-14 NOTE — PROGRESS NOTES
0800   [address] 10/17/17 admitted to L+D for SROM at 0600. LOts of clear fluid noted. Nitrazine test positive. Pt denies any problems with the pregnancy or outside of the pregnancy. Pt denies any bleeding or c/o cointractions. GBS unknown. Pt states her last baby was delivered vaginally with no epidural.  9:00 AM Dr Kyaw Kiser at the bedside to check the pt. Bedside ultrasound done to verify vertex. SVE 3-4/80/-3 ruptured for large amounts of clear fluid. 1000  Pt watching TV at the present time denies any needs. 1115  zofran 4mg IVP given for c/o nausea. SVE 7/100/0 CHARLOTTE Winn RN notified. Pt transferred to OR #2 for delivery. Dr Kyaw Kiser called and notified. Dr Andrea Sunshine aware for back up  1117  Pt in the OR. Dr Tania Moya at the bedside along with Dr Andrea Sunshine. 1120  Dr Kyaw Kiser at the bedside SVE 10/100/0 Pt starting to push  1130  Pt cont's to push and doing well  1146  Delivery of [facility] strong cry. Baby skin to skin with the pt. 1155  Delivery of placenta pitocin bolus started. 12:35 PM Family at the bedside  12:37 PM Pt conts to do well attempting to sleep at the present time  1309  Medicated with percocet 1 po and ibuprofen 800mg po for c/o lower abd cramping  1400  FF @ U underpad changed. Pt sitting up in the bed eating her reg lunch. Pt states Motrin and percocet helped with her pain  3:10 PM Pt up OOB to the BR voided qs amounts of clear yellow urine. went over pericare with the pt. Under wear and ice pack applied.

## 2017-09-14 NOTE — IP AVS SNAPSHOT
[address] [phone] Patient: Vance Vivas MRN: [id number] :1985 Current Discharge Medication List  
  
START taking these medications Dose & Instructions Dispensing Information Comments Morning Noon Evening Bedtime  
 docusate sodium 100 mg capsule Commonly known as:  Cheli Lyles Your last dose was: Your next dose is:    
   
   
 Dose:  100 mg Take 1 Cap by mouth two (2) times a day for 7 days. Quantity:  14 Cap Refills:  0  
     
   
   
   
  
 ibuprofen 800 mg tablet Commonly known as:  MOTRIN Your last dose was: Your next dose is:    
   
   
 Dose:  800 mg Take 1 Tab by mouth every six (6) hours as needed. Quantity:  30 Tab Refills:  0 CONTINUE these medications which have NOT CHANGED Dose & Instructions Dispensing Information Comments Morning Noon Evening Bedtime PRENATAL VITAMIN PO Your last dose was: Your next dose is: Take  by mouth. Refills:  0 Where to Get Your Medications Information on where to get these meds will be given to you by the nurse or doctor. ! Ask your nurse or doctor about these medications  
  docusate sodium 100 mg capsule  
 ibuprofen 800 mg tablet

## 2017-09-14 NOTE — L&D DELIVERY NOTE
Delivery Summary    Patient: Tracy Hobbs MRN: [id number]  SSN: [id number]    YOB: 1985  Age: 28 y.o. Sex: female        Labor Events:    Labor: No    Rupture Date:      Rupture Time:      Rupture Type      Amniotic Fluid Volume:      Amniotic Fluid Description:         Induction: None        Augmentation: None    Labor Complications: Additional Complications:        Cervical Ripenin2017  11:31 AM  None      Delivery Events:  Episiotomy: None    Laceration(s): None      Repaired: None     Number of Repair Packets:      Suture Type and Size:         Estimated Blood Loss (ml):          Information for the patient's :  Tamara Wells [[id number]]     Delivery Summary - Baby    Delivery Date: 2017   Delivery Time: 11:46 AM   Delivery Type: Vaginal, Spontaneous Delivery  Sex:  male  Gestational Age: 32w1d  Delivery Clinician:  Leida Kyle  Living?: Living   Delivery Location: L&D             APGARS  One minute Five minutes Ten minutes   Skin Color: 1    1       Heart Rate: 2   2         Reflex Irritability: 2   2         Muscle Tone: 2   2       Respiration: 2   2         Total: 9   9           Presentation: Vertex  Position: Left Occiput Anterior  Resuscitation Method:  Tactile Stimulation;Suctioning-bulb;None     Meconium Stained: None    Cord Information: 3 Vessels   Complications: None  Cord Blood Sent?:  Yes    Blood Gases Sent?:  No    Placenta:  Date/Time:  11:55 AM  Removal: Spontaneous      Appearance: Normal      Measurements:  Birth Weight: 2.835 kg    Birth Length: 47.6 cm   Head Circumference: 30.5 cm     Chest Circumference: 29.5 cm    Abdominal Girth: 30 cm    Other Providers: NARCISA ELENA;STEVE MONCADA;MELIZA CALI;NICHOLE DARNELL;ERIN BERMUDEZ Obstetrician;Primary Nurse;Nursery Nurse;Resident; Resident           Cord Blood Results:  Information for the patient's :  Tamara Wells [[id number]]   No results found for: [place]ka Artis, PCTDIG, BILI, ABORHEXT, [address]    Information for the patient's :  Iliana Shankar, Male [[id number]]   No results found for: APH, APCO2, APO2, AHCO3, ABEC, ABDC, O2ST, SITE, [place], PHI, [place], PO2I, HCO3I, SO2I, IBD     Information for the patient's :  Iliana Shankar, Male [[id number]]   No results found for: EPHV, PCO2V, PO2V, HCO3V, O2STV, EBDV

## 2017-09-15 PROCEDURE — 65270000029 HC RM PRIVATE

## 2017-09-15 PROCEDURE — 74011250637 HC RX REV CODE- 250/637: Performed by: FAMILY MEDICINE

## 2017-09-15 RX ADMIN — IBUPROFEN 800 MG: 800 TABLET, FILM COATED ORAL at 16:10

## 2017-09-15 RX ADMIN — Medication 1 TABLET: at 10:36

## 2017-09-15 RX ADMIN — IBUPROFEN 800 MG: 800 TABLET, FILM COATED ORAL at 10:36

## 2017-09-15 RX ADMIN — OXYCODONE AND ACETAMINOPHEN 1 TABLET: 5; 325 TABLET ORAL at 23:46

## 2017-09-15 RX ADMIN — IBUPROFEN 800 MG: 800 TABLET, FILM COATED ORAL at 23:46

## 2017-09-15 RX ADMIN — IBUPROFEN 800 MG: 800 TABLET, FILM COATED ORAL at 02:46

## 2017-09-15 RX ADMIN — OXYCODONE AND ACETAMINOPHEN 1 TABLET: 5; 325 TABLET ORAL at 09:42

## 2017-09-15 NOTE — PROGRESS NOTES
Post-Partum Day Number 1 Progress Note    Patient doing well post-partum without significant complaint. Pain well controlled. Lochia normal.  Tolerating diet. Ambulating. Voiding without difficulty. No flatus. No BM. Vitals:  No data found. Temp (24hrs), Av.4 °F (36.9 °C), Min:98.1 °F (36.7 °C), Max:98.7 °F (37.1 °C)      Exam:  Patient without distress. CTAB, no w/r/r/c.               RRR, +S1 and S2, no m/r/g. Abdomen soft, fundus firm at level of umbilicus, tender to palpation               Perineum with normal lochia noted. Lower extremities:  No edema. No palpable cords or tenderness. Lab/Data Review:  No results found for this or any previous visit (from the past 12 hour(s)). Assessment and Plan:    Magaly Aguilar is a 28 y.o. [id number] s/p  at 35 weeks 2 days. Patient appears to be having uncomplicated post-partum course. 1. Continue routine perineal care and maternal education. 2. Plan discharge tomorrow if no problems occur. Patient discussed with Dr. Melvin Haynes.                  Rene Benites MD  Family Medicine Resident

## 2017-09-15 NOTE — LACTATION NOTE
Problem: Lactation Care Plan  Goal: *Infant latching appropriately  Outcome: Progressing Towards Goal  Mom states baby wanted to nurse all night long. Discussed waking the baby at least every three hours during the day to attempt feeding. Mom also giving formula. Discussed supply and demand, and possible nipple confusion in [place] with patient.        Pt will successfully establish breastfeeding by feeding in response to early feeding cues   or wake every 3h, will obtain deep latch, and will keep log of feedings/output. Taught to BF at hunger cues and or q 2-3 hrs and to offer 10-20 drops of hand expressed colostrum at any non-feeds.        Breast Assessment  Left Breast: Medium  Left Nipple: Everted, Intact  Right Breast: Medium  Right Nipple: Everted, Intact  Breast- Feeding Assessment  Attends Breast-Feeding Classes: No  Breast-Feeding Experience:  (1 1/2 year with first)  Breast Trauma/Surgery: No  Type/Quality: Good (per mom, not seen at breast, also giving formula)          Goal: *Weight loss less than 10% of birth weight  Outcome: Progressing Towards Goal      Encouraged mom to attempt feeding with baby led feeding cues. Just as sucking on fingers, rooting, mouthing. Looking for 8-12 feedings in 24 hours. Don't limit baby at breast, allow baby to come of breast on it's own. Baby may want to feed  often and may increase number of feedings on second day of life.  Skin to skin encouraged.        If baby doesn't nurse,  Mom should  hand express  10-20 drops of colostrum and drip into baby's mouth, or give to baby by finger feeding, cup feeding, or spoon feeding at least every 2-3 hours.       Given information in [place]     Problem: Patient Education: Go to Patient Education Activity  Goal: Patient/Family Education  Outcome: Progressing Towards Goal  Reviewed breastfeeding basics:  Supply and demand,  stomach size, early  Feeding cues, skin to skin, positioning and baby led latch-on, assymetrical latch with signs of good, deep latch vs shallow, feeding frequency and duration, and log sheet for tracking infant feedings and output. Breastfeeding Booklet and Warm line information given. Discussed typical  weight loss and the importance of infant weight checks with pediatrician 1-2 post discharge.      Pt chooses to do both breast and bottle.   Discussed effects of early supplementation on breastfeeding success; may decrease breastmilk production and supply, increase risk for pathological engorgement, baby may develop preference for faster flow from bottles vs breast, and baby's stomach can be stretched if larger volumes of formula are given.      All information given in [place].

## 2017-09-15 NOTE — ROUTINE PROCESS
Bedside and Verbal shift change report given to FARHANA Crook RN (oncoming nurse) by Shanita Eden RN (offgoing nurse). Report included the following information SBAR, Intake/Output, MAR, Accordion and Recent Results.

## 2017-09-15 NOTE — PROGRESS NOTES
Bedside and Verbal shift change report given to RY Carney RN (oncoming nurse) by TINA Crook RN (offgoing nurse). Report included the following information SBAR, Kardex and MAR.

## 2017-09-16 VITALS
WEIGHT: 174 LBS | TEMPERATURE: 97.8 F | BODY MASS INDEX: 35.08 KG/M2 | RESPIRATION RATE: 16 BRPM | DIASTOLIC BLOOD PRESSURE: 52 MMHG | HEIGHT: 59 IN | SYSTOLIC BLOOD PRESSURE: 96 MMHG | HEART RATE: 75 BPM

## 2017-09-16 PROCEDURE — 74011250637 HC RX REV CODE- 250/637: Performed by: FAMILY MEDICINE

## 2017-09-16 RX ORDER — IBUPROFEN 800 MG/1
800 TABLET ORAL
Qty: 30 TAB | Refills: 0 | Status: SHIPPED | OUTPATIENT
Start: 2017-09-16

## 2017-09-16 RX ORDER — DOCUSATE SODIUM 100 MG/1
100 CAPSULE, LIQUID FILLED ORAL 2 TIMES DAILY
Qty: 14 CAP | Refills: 0 | Status: SHIPPED | OUTPATIENT
Start: 2017-09-16 | End: 2017-09-16

## 2017-09-16 RX ORDER — DOCUSATE SODIUM 100 MG/1
100 CAPSULE, LIQUID FILLED ORAL 2 TIMES DAILY
Qty: 14 CAP | Refills: 0 | Status: SHIPPED | OUTPATIENT
Start: 2017-09-16 | End: 2017-09-23

## 2017-09-16 RX ADMIN — IBUPROFEN 800 MG: 800 TABLET, FILM COATED ORAL at 06:35

## 2017-09-16 NOTE — DISCHARGE SUMMARY
Obstetrical Discharge Summary     Name: Toñito Dubois MRN: [id number]  SSN: [id number]    YOB: 1985  Age: 28 y.o. Sex: female      Admit Date: 2017    Discharge Date: 2017     Admitting Physician: Francisco Lynn MD     Attending Physician:  Francisco Lynn MD     Admission Diagnoses: Rupture of membranes with clear amniotic fluid  Pregnancy    Discharge Diagnoses:   Information for the patient's :  Verónica Galeas, Male [[id number]]   Delivery of a 2.835 kg male infant via Vaginal, Spontaneous Delivery on 2017 at 11:46 AM  by . Apgars were 9 and 9. Additional Diagnoses:   Hospital Problems  Date Reviewed: 2017          Codes Class Noted POA    Rupture of membranes with clear amniotic fluid ICD-10-CM: O42.019  ICD-9-CM: 658.10  2017 Unknown        Pregnancy ICD-10-CM: Z33.1  ICD-9-CM: V22.2  2017 Unknown             Lab Results   Component Value Date/Time    Rubella, External immune 2017       Immunization(s):   Immunization History   Administered Date(s) Administered    Tdap 08/10/2017        Hospital Course: Pt presented to the hospital with  premature rupture of membranes. Pt is s/p  at 35 weeks 2 days. Pregnancy was complicated by premature,  rupture of membranes. Uncomplicated delivery. PNL: O+, RH +, G/C neg, Hep B surface AG neg, HIV non reactive, T pallidum neg, Rubella and VZV immune. GBS status unknown (treated with 1x PCN). Patient appears to be having uncomplicated post-partum course.       Condition at Discharge: Stable    Disposition:  Home    Patient Instructions:   Current Discharge Medication List      START taking these medications    Details   ibuprofen (MOTRIN) 800 mg tablet Take 1 Tab by mouth every six (6) hours as needed. Qty: 30 Tab, Refills: 0         CONTINUE these medications which have NOT CHANGED    Details   PRENATAL VIT CALC,IRON,FOLIC (PRENATAL VITAMIN PO) Take  by mouth. Reference my discharge instructions.     Follow-up Appointments   Procedures    FOLLOW UP VISIT Appointment in: 6 Weeks Call [facility] and make appointment     Call [facility] and make appointment     Standing Status:   Standing     Number of Occurrences:   1     Order Specific Question:   Appointment in     Answer:   6 Weeks        Signed By:  Serenity Bennett MD    Family Medicine Resident

## 2017-09-16 NOTE — PROGRESS NOTES
Post-Partum Day Number 2 Progress/Discharge Note    Patient doing well post-partum without significant complaint. Pain well controlled. Lochia normal.  Tolerating regular diet. Ambulating. Voiding without difficulty. Postive flatus. Negative BM. Vitals:    Patient Vitals for the past 8 hrs:   BP Temp Pulse Resp   17 0648 96/52 97.8 °F (36.6 °C) 75 16     Temp (24hrs), Av.1 °F (36.7 °C), Min:97.8 °F (36.6 °C), Max:98.3 °F (36.8 °C)      Vital signs stable, afebrile. Exam:  Patient without distress. CTAB, no w/r/r/c               RRR, + S1 and S2, no m/r/g               Abdomen soft, fundus firm at level of umbilicus, non tender               Perineum with normal lochia noted. Lower extremities are negative for swelling, cords or tenderness. Lab/Data Review:  No results found for this or any previous visit (from the past 12 hour(s)). Assessment and Plan:      Tonio Black is a 28 y.o. [id number] s/p  at 35 weeks 2 days. Pregnancy was complicated by premature,  rupture of membranes. Uncomplicated delivery. Patient appears to be having uncomplicated post-partum course. PNL: O+, RH +, G/C neg, Hep B surface AG neg, HIV non reactive, T pallidum neg, Rubella and VZV immune. GBS status unknown (treated with 1x PCN). 1. Continue routine perineal care and maternal education. 2. Plan discharge for today with follow up in [facility] office in 6 weeks. Patient discussed with Dr. Jeyson David MD  Family Medicine Resident    Attestation by Dr. Bin Connor I have assessed the patient and agree with Dr. Nitza Wilson progress note. The patient received TDAP in 8/10/17. Follow up in 6 weeks with [facility].

## 2017-09-16 NOTE — DISCHARGE INSTRUCTIONS
Alimentación de lan recién nacido: Instrucciones de cuidado - [ Feeding Your : Care Instructions ]  Instrucciones de Zaira Lathe a un recién nacido es kiara cuestión importante para los [place]. Los expertos recomiendan que los recién nacidos sj alimentados cuando lo pidan. [place] significa amamantar o darle biberón a lan bebé cuando muestre señales de hambre, en lugar de establecer un horario estricto. Los recién nacidos responden a mike sensaciones de [place]. Comen cuando tienen hambre y luis enrique de comer cuando están llenos. La mayoría de los expertos también recomiendan amamantar zay al menos el primer año. Kiara preocupación común para los padres es si lan bebé está comiendo lo suficiente. Hable con lan médico si está preocupada por cuánto está comiendo lan bebé. La mayoría de los recién nacidos aj [employer] primeros días después del nacimiento, [place] lo recuperan en kiara [facility]. Después de las  [facility], lan bebé debe continuar aumentando de peso de forma anahy. La atención de seguimiento es kiara parte clave del tratamiento y la seguridad de lan hijo. Asegúrese de hacer y acudir a todas las citas, y llame a lan médico si lan hijo está teniendo problemas. También es kiara buena idea saber los resultados de los exámenes de lan hijo y mantener kiara lista de los medicamentos que robin. ¿Cómo puede cuidar a lan hijo en el hogar? · Permita a lan bebé que se alimente cuando lo pida. [place] [address] primeras 2 semanas, estas thom tienen lugar cada 1 a 3 horas (alrededor de 8 a 12 veces en un período de 24 horas) para los bebés [facility]. Estas primeras sesiones de amamantamiento pueden durar sólo unos minutos. Con el tiempo, las sesiones se irán haciendo más largas y podrían tener lugar con menos frecuencia. ¨ Es posible que los bebés que se alimentan con leche de fórmula necesiten hacerlo con kiara frecuencia un poco halina, aproximadamente entre 6 y 10 veces cada 24 horas.  Comerán de 2 a 3 onzas (60 a 90 ml) cada 3 a 4 horas zay las primeras semanas de bethany. ¨ A los 2 meses, la mayoría de los bebés tienen harsh rutina de alimentación establecida. Elvin a veces la rutina de lan bebé puede cambiar, [place], por [place], zay los períodos de crecimiento acelerado cuando lan bebé podría tener hambre más a menudo. · Es posible que deba despertar a lan bebé para alimentarle zay los primeros días posteriores al nacimiento. · No le dé ningún otro tipo de [employer] no sea [address] o de fórmula hasta que lan bebé cumpla 1 año de Ketan. La leche de [place], la [place] de cabra y la leche de soya no tienen los nutrientes que necesitan los niños muy pequeños para crecer y desarrollarse adecuadamente. [place] de kathy y de [place] son muy difíciles de digerir para los bebés pequeños. · Pregúntele a lan médico acerca de darle un suplemento de vitamina D a partir de los primeros días después del nacimiento. · Si decide que lan bebé pase del amamantamiento a la alimentación con biberón, pruebe estas sugerencias. ¨ Pruebe que mala de un biberón. Poco a poco reduzca el número de veces que le amamanta cada día. Zay harsh semana, sustituya un amamantamiento por alimentación con biberón en piter de mike períodos de alimentación diaria. ¨ Cada semana, elija otra sesión de amamantamiento para sustituir o para reducir. ¨ Ofrézcale el biberón antes de cada amamantamiento. ¿Cuándo debe pedir ayuda? Preste especial atención a los [employer] emiliano de lan hijo y asegúrese de comunicarse con lan médico si:  · Tiene preguntas acerca de la alimentación de lan bebé. · Está preocupada de que lan bebé no esté comiendo lo suficiente. · Tiene problemas para alimentar a lan bebé. ¿Dónde puede encontrar más información en inglés? Omer Jeffries a [email or web address] Rita Markham K938 en la búsqueda para aprender más acerca de \"Alimentación de lan recién nacido:  Instrucciones de cuidado - [ Feeding Your [place]: Care Instructions ]. \"  Revisado: 26 julio, 2016  Versión del contenido: 11.3  © 3838-3872 Healthwise, Incorporated. Las instrucciones de cuidado fueron adaptadas bajo licencia por [employer] (which disclaims liability or warranty for this information). Si usted tiene [place] afección médica o sobre estas instrucciones, siempre pregunte a lan profesional de emiliano. Healthwise, Incorporated niega toda garantía o responsabilidad por lan uso de esta información. Después del parto (período de posparto): Instrucciones de cuidado - [ After Your Delivery (the Postpartum Period): Care Instructions ]  Instrucciones de cuidado  Felicidades por el nacimiento de lan bebé. Al igual que el [place], el tiempo con el recién nacido puede ser un momento de [place], Clorinda Alonso y agotamiento. Es posible que se sienta braswell al mirar la wallace de lan pequeño bebé. También podría sentirse abrumada por lan nuevo ritmo de sueño y las nuevas responsabilidades. Al principio, los bebés suelen dormir zay el día y permanecen despiertos zay la noche. No tienen ningún patrón ni rutina. Podrían karli gritos ahogados, sacudirse y despertarse, o parecer tod si tuvieran los ojos cruzados (bizcos). Todo esto es normal, e incluso la pueden hacer sonreír. Zay las primeras semanas siguientes al parto, trate de cuidarse john. Podría tardar de 4 a 6 semanas en volver a sentirse usted misma, y posiblemente más tiempo si le kenyon hecho harsh cesárea. Es probable que se sienta muy fatigada zay varias semanas. Krysten días estarán llenos de [place], armen también de mucha alegría. La atención de seguimiento es harsh parte clave de lan tratamiento y seguridad. Asegúrese de hacer y acudir a todas las citas, y llame a lan médico si está teniendo problemas. También es harsh buena idea saber los resultados de los exámenes y mantener harsh lista de los medicamentos que robin. ¿Cómo puede cuidarse en el hogar?   Cuide lan cuerpo después del parto  · Utilice toallas sanitarias en vez de tampones para las pérdidas de gayatri que podrían durar hasta 2 semanas. · Alivie los cólicos con ibuprofeno (Advil, Motrin). · Alivie el dolor de las hemorroides y la monster entre la vagina y el recto con compresas de hielo o de infusión de hamamelis [place] (\"witch hazel\"). · Alivie el estreñimiento bebiendo abundante líquido y comiendo alimentos ricos en fibra. Pregúntele a lan médico acerca de los ablandadores de heces de [place]. · Límpiese con un chorrito suave de agua tibia de harsh botella en vez de hacerlo con papel higiénico.  · Apollo un baño de asiento en agua tibia varias veces al día. · Use un buen sostén de lactancia. Alivie el dolor y la hinchazón de los senos con toallitas de aseo húmedas tibias. · Si no está amamantando, utilice hielo en lugar de calor para aliviar el dolor de los senos. · Si está amamantando, lan período menstrual podría no comenzar hasta después de varios meses. Es posible que Reinaldo, y más tiempo al principio, de tod lo hacía antes del [place]. · Espere hasta que haya sanado (de 4 a 6 semanas) antes de volver a tener relaciones sexuales. Lan médico le dirá cuándo puede tener relaciones sexuales. · Trate de no viajar con el bebé zay las primeras 5 o 6 semanas. Si hace un viaje bandar en automóvil, jose paradas frecuentes para caminar y estirarse. Evite el agotamiento  · Descanse todos los días. Trate de dormir la siesta cuando lan bebé también lo hace. · Pídale a otro adulto que la acompañe por unos adonis después del [place]. · Planifique el cuidado de los niños si tiene otros hijos. · Sea flexible para que pueda comer a horas fuera de lo común y dormir cuando lo necesite. Tanto usted tod lan bebé están creando horarios nuevos. · Planee pequeñas salidas para estar afuera de la casa. El cambio podría hacer que se sienta menos fatigada. · Pida ayuda para cocinar y [address] hogar y las compras.  Recuerde que lan principal tarea consiste en cuidar a lan bebé. Conozca la ayuda que puede recibir en [place]ard de tener depresión posparto  · La depresión posparto es común zay las primeras 1 a 2 semanas siguientes al nacimiento. Podría llorar o sentirse diego o irritable sin razón alguna. · Descanse cada vez que pueda hacerlo. Estar fatigada le dificulta manejar mike emociones. · Salga a caminar con lan bebé. · Hable con lan alondra, mike amigos y mike familiares acerca de mike sentimientos. · Si mike síntomas schumacher más de unas pocas semanas, o si se siente muy deprimida, pídale ayuda a lan médico.  · La depresión posparto puede tratarse. Los grupos de apoyo y la asesoría psicológica pueden ser de [place]. A veces, los medicamentos también pueden ayudar. Manténgase saludable  · Coma alimentos sanos para tener más energía, producir harsh buena Carly Juan J materna y adelgazar las libras adicionales que engordó con el bebé. · Si amamanta, evite el alcohol y las drogas. No fume. Si dejó de fumar zay el embarazo, felicitaciones. · Inicie ejercicios diarios después de 4 a 6 semanas, armen descanse cuando se sienta fatigada. · Aprenda ejercicios para tonificar el abdomen. Thu los ejercicios de [place] para recuperar la fuerza de los músculos pélvicos. Puede hacer los ejercicios de Kegel mientras está de pie o sentada. ¨ [employer]priete los mismos músculos que usted usaría para detener la [place]. El abdomen y los muslos no deben moverse. ¨ Manténgalos apretados zay 3 segundos y, luego, relájelos otros 3 segundos. ¨ Empiece con 3 segundos. Selestine Riccardo, añada 1 julia cada semana hasta que sea capaz de apretar zay 10 segundos. ¨ Repita el ejercicio entre 10 y 13 veces cada sesión. Thu eduardo o más sesiones cada día. · Busque harsh clase para nuevas madres y recién nacidos que tenga un tiempo de ejercicio. · Si le kenyon hecho harsh cesárea, dese un poco más de tiempo antes de hacer ejercicio, y tenga cuidado. ¿Cuándo debe pedir ayuda?   Llame al 911 en cualquier momento que considere que necesita atención de [place]. Por ejemplo, llame si:  · Se desmayó (perdió el conocimiento). Llame a lan médico ahora mismo o busque atención médica inmediata si:  · Tiene sangrado vaginal intenso. [place] significa que está expulsando coágulos sanguíneos y empapando harsh toalla sanitaria cada hora por 2 horas o más. · Está mareada o aturdida, o siente tod que se puede [address] [facility]. · Tiene fiebre. · Tiene dolor abdominal nuevo o que empeora. Preste especial atención a los cambios en lan emiliano y asegúrese de comunicarse con lan médico si:  · Lan sangrado vaginal parece volverse más intenso. · Tiene flujo vaginal nuevo o que empeora. · Se siente diego, ansiosa o sin esperanzas zay más de unos pocos días. · No mejora tod se esperaba. ¿Dónde puede encontrar más información en inglés? Yordy huber [email or web address] Aime Medrano G151 en la búsqueda para aprender más acerca de \"Después del parto (período de posparto): Instrucciones de cuidado - [ After Your Delivery (the Postpartum Period): Care Instructions ]. \"  Revisado: 16 marzo, 2017  Versión del contenido: 11.3  © 5822-2413 Healthwise, Incorporated. Las instrucciones de cuidado fueron adaptadas bajo licencia por [employer] (which disclaims liability or warranty for this information). Si usted tiene [place] afección médica o sobre estas instrucciones, siempre pregunte a lan profesional de emiliano. Healthwise, Incorporated niega toda garantía o responsabilidad por lan uso de esta información.

## 2017-09-16 NOTE — ROUTINE PROCESS
Bedside and Verbal shift change report given to Stephanie Dominguez RN (oncoming nurse) by Jacqueline Farris RN (offgoing nurse). Report included the following information SBAR, Kardex, Intake/Output and MAR.

## 2017-09-16 NOTE — ROUTINE PROCESS
I have reviewed discharge instructions with the patient. The patient verbalized understanding.  Used interpretor phone for d/c instructions and teaching

## 2017-09-16 NOTE — LACTATION NOTE
Problem: Lactation Care Plan  Goal: *Infant latching appropriately  Outcome: Resolved/Met Date Met:  09/16/17  Mom has been giving baby formula. States she doesn't have much milk. States baby latches to breast but then fusses and abigail. Discussed supply and demand and possible nipple confusion. Mom states will nurse more when her milk is in.          Goal: *Weight loss less than 10% of birth weight  Outcome: Resolved/Met Date Met:  09/16/17  Encouraged mom to attempt feeding with baby led feeding cues. Just as sucking on fingers, rooting, mouthing. Looking for 8-12 feedings in 24 hours. Don't limit baby at breast, allow baby to come of breast on it's own. Baby may want to feed  often and may increase number of feedings on second day of life. Skin to skin encouraged.        If baby doesn't nurse,  Mom should  Pump and give infant any expressed milk. If not pumping any milk, mom should contact pediatrician for possible need for supplementation.          Problem: Patient Education: Go to Patient Education Activity  Goal: Patient/Family Education  Outcome: Resolved/Met Date Met:  09/16/17  Discussed eating a healthy diet. Instructed mother to eat a variety of foods in order to get a well balanced diet. She should consume an extra 300-500 calories per day (more than her non-pregnant requirement.) These extra calories will help provide energy needed for optimal breast milk production. Mother also encouraged to \"drink to thirst\" and it is recommended that she drink fluids such as water and fruit/vegetable juice. Nutritious snacks should be available so that she can eat throughout the day to help satisfy her hunger and maintain a good milk supply. Continue taking your prenatal vitamins as long as you breast feed.       Chart shows numerous feedings, void, stool WNL. Discussed Importance of monitoring outputs and feedings on first week of  Breastfeeding.   Discussed ways to tell if baby getting enough, ie  Voids and stools, by day 7, baby should have at least  4-6 wet diapers a day, change in color of stool to a seedy yellow, and return to birth wt within 2 weeks with a steady increase after that. .  Follow up with pediatrician visit for weight check in 1-2 days reviewed. Discussed Breast feeding support groups and encouraged to call Etacts line number, [phone] or [facility] at [phone]  for any questions/problems that arise.       Encouraged mom to attempt feeding with baby led feeding cues. Just as sucking on fingers, rooting, mouthing. Looking for 8-12 feedings in 24 hours. Don't limit baby at breast, allow baby to come of breast on it's own. Baby may want to feed  often and may increase number of feedings on second day of life. Skin to skin encouraged.        If baby doesn't nurse,  Mom should  Pump and give infant any expressed milk. If not pumping any milk, mom should contact pediatrician for possible need for supplementation.          Engorgement Care Guidelines:  Anticipatory guidance shared. Reviewed how milk is made and normal phases of milk production. Taught care of engorged breasts -and how to help. If mom should experience engorged breas frequent breastfeeding encouraged, cool packs and motrin as tolerated.   Yobani Pamler      Call your doctor, midwife and/or lactation consultant if:  · Baby is having no wet or dirty diapers   · Baby has dark colored urine after day 3  (should be pale yellow to clear)   · Baby has dark colored stools after day 4  (should be mustard yellow, with no meconium)   · Baby has fewer wet/soiled diapers or nurses less   frequently than the goals listed here   · Mom has symptoms of mastitis   (sore breast with fever, chills, flu-like aching)            Information given to patient in [facility] and [place].